# Patient Record
Sex: MALE | Race: WHITE | NOT HISPANIC OR LATINO | Employment: FULL TIME | URBAN - METROPOLITAN AREA
[De-identification: names, ages, dates, MRNs, and addresses within clinical notes are randomized per-mention and may not be internally consistent; named-entity substitution may affect disease eponyms.]

---

## 2019-07-10 ENCOUNTER — SURGERY - HEALTHEAST (OUTPATIENT)
Dept: CARDIOLOGY | Facility: CLINIC | Age: 49
End: 2019-07-10

## 2019-07-11 ASSESSMENT — MIFFLIN-ST. JEOR: SCORE: 1868.24

## 2019-07-12 ASSESSMENT — MIFFLIN-ST. JEOR: SCORE: 1849.64

## 2019-07-13 ASSESSMENT — MIFFLIN-ST. JEOR: SCORE: 1833.76

## 2019-07-17 ENCOUNTER — AMBULATORY - HEALTHEAST (OUTPATIENT)
Dept: CARDIAC REHAB | Facility: CLINIC | Age: 49
End: 2019-07-17

## 2019-07-17 DIAGNOSIS — I21.11 ST ELEVATION MYOCARDIAL INFARCTION INVOLVING RIGHT CORONARY ARTERY (H): ICD-10-CM

## 2019-07-17 DIAGNOSIS — Z95.5 STENTED CORONARY ARTERY: ICD-10-CM

## 2019-07-19 ENCOUNTER — AMBULATORY - HEALTHEAST (OUTPATIENT)
Dept: CARDIAC REHAB | Facility: CLINIC | Age: 49
End: 2019-07-19

## 2019-07-19 DIAGNOSIS — I21.11 ST ELEVATION MYOCARDIAL INFARCTION INVOLVING RIGHT CORONARY ARTERY (H): ICD-10-CM

## 2019-07-19 DIAGNOSIS — Z95.5 STENTED CORONARY ARTERY: ICD-10-CM

## 2019-07-22 ENCOUNTER — AMBULATORY - HEALTHEAST (OUTPATIENT)
Dept: CARDIAC REHAB | Facility: CLINIC | Age: 49
End: 2019-07-22

## 2019-07-22 DIAGNOSIS — I21.11 ST ELEVATION MYOCARDIAL INFARCTION INVOLVING RIGHT CORONARY ARTERY (H): ICD-10-CM

## 2019-07-22 DIAGNOSIS — Z95.5 STENTED CORONARY ARTERY: ICD-10-CM

## 2019-07-24 ENCOUNTER — AMBULATORY - HEALTHEAST (OUTPATIENT)
Dept: CARDIAC REHAB | Facility: CLINIC | Age: 49
End: 2019-07-24

## 2019-07-24 DIAGNOSIS — I21.11 ST ELEVATION MYOCARDIAL INFARCTION INVOLVING RIGHT CORONARY ARTERY (H): ICD-10-CM

## 2019-07-24 DIAGNOSIS — Z95.5 STENTED CORONARY ARTERY: ICD-10-CM

## 2019-07-26 ENCOUNTER — AMBULATORY - HEALTHEAST (OUTPATIENT)
Dept: CARDIAC REHAB | Facility: CLINIC | Age: 49
End: 2019-07-26

## 2019-07-26 DIAGNOSIS — I21.11 ST ELEVATION MYOCARDIAL INFARCTION INVOLVING RIGHT CORONARY ARTERY (H): ICD-10-CM

## 2019-07-26 DIAGNOSIS — Z95.5 STENTED CORONARY ARTERY: ICD-10-CM

## 2019-07-29 ENCOUNTER — AMBULATORY - HEALTHEAST (OUTPATIENT)
Dept: CARDIAC REHAB | Facility: CLINIC | Age: 49
End: 2019-07-29

## 2019-07-29 DIAGNOSIS — I21.11 ST ELEVATION MYOCARDIAL INFARCTION INVOLVING RIGHT CORONARY ARTERY (H): ICD-10-CM

## 2019-07-29 DIAGNOSIS — Z95.5 STENTED CORONARY ARTERY: ICD-10-CM

## 2019-08-02 ENCOUNTER — AMBULATORY - HEALTHEAST (OUTPATIENT)
Dept: CARDIAC REHAB | Facility: CLINIC | Age: 49
End: 2019-08-02

## 2019-08-02 DIAGNOSIS — Z95.5 STENTED CORONARY ARTERY: ICD-10-CM

## 2019-08-02 DIAGNOSIS — I21.11 ST ELEVATION MYOCARDIAL INFARCTION INVOLVING RIGHT CORONARY ARTERY (H): ICD-10-CM

## 2019-08-05 ENCOUNTER — OFFICE VISIT - HEALTHEAST (OUTPATIENT)
Dept: CARDIOLOGY | Facility: CLINIC | Age: 49
End: 2019-08-05

## 2019-08-05 DIAGNOSIS — E78.2 MIXED HYPERLIPIDEMIA: ICD-10-CM

## 2019-08-05 DIAGNOSIS — R73.03 PREDIABETES: ICD-10-CM

## 2019-08-05 DIAGNOSIS — I95.9 HYPOTENSION, UNSPECIFIED HYPOTENSION TYPE: ICD-10-CM

## 2019-08-05 DIAGNOSIS — I21.11 ST ELEVATION MYOCARDIAL INFARCTION INVOLVING RIGHT CORONARY ARTERY (H): ICD-10-CM

## 2019-08-05 DIAGNOSIS — R06.09 EXERTIONAL DYSPNEA: ICD-10-CM

## 2019-08-05 DIAGNOSIS — I25.118 CORONARY ARTERY DISEASE INVOLVING NATIVE CORONARY ARTERY OF NATIVE HEART WITH OTHER FORM OF ANGINA PECTORIS (H): ICD-10-CM

## 2019-08-05 DIAGNOSIS — I10 ESSENTIAL HYPERTENSION: ICD-10-CM

## 2019-08-05 DIAGNOSIS — I21.21 ACUTE ST ELEVATION MYOCARDIAL INFARCTION (STEMI) DUE TO OCCLUSION OF CIRCUMFLEX CORONARY ARTERY (H): ICD-10-CM

## 2019-08-05 DIAGNOSIS — I25.5 ISCHEMIC CARDIOMYOPATHY: ICD-10-CM

## 2019-08-05 DIAGNOSIS — R61 DIAPHORESIS: ICD-10-CM

## 2019-08-05 ASSESSMENT — MIFFLIN-ST. JEOR: SCORE: 1841.02

## 2019-08-07 ENCOUNTER — AMBULATORY - HEALTHEAST (OUTPATIENT)
Dept: CARDIAC REHAB | Facility: CLINIC | Age: 49
End: 2019-08-07

## 2019-08-07 DIAGNOSIS — I21.11 ST ELEVATION MYOCARDIAL INFARCTION INVOLVING RIGHT CORONARY ARTERY (H): ICD-10-CM

## 2019-08-07 DIAGNOSIS — Z95.5 STENTED CORONARY ARTERY: ICD-10-CM

## 2019-08-09 ENCOUNTER — AMBULATORY - HEALTHEAST (OUTPATIENT)
Dept: CARDIAC REHAB | Facility: CLINIC | Age: 49
End: 2019-08-09

## 2019-08-09 DIAGNOSIS — Z95.5 STENTED CORONARY ARTERY: ICD-10-CM

## 2019-08-09 DIAGNOSIS — I21.11 ST ELEVATION MYOCARDIAL INFARCTION INVOLVING RIGHT CORONARY ARTERY (H): ICD-10-CM

## 2019-08-12 ENCOUNTER — AMBULATORY - HEALTHEAST (OUTPATIENT)
Dept: CARDIAC REHAB | Facility: CLINIC | Age: 49
End: 2019-08-12

## 2019-08-12 DIAGNOSIS — Z95.5 STENTED CORONARY ARTERY: ICD-10-CM

## 2019-08-12 DIAGNOSIS — I21.11 ST ELEVATION MYOCARDIAL INFARCTION INVOLVING RIGHT CORONARY ARTERY (H): ICD-10-CM

## 2019-08-14 ENCOUNTER — AMBULATORY - HEALTHEAST (OUTPATIENT)
Dept: CARDIAC REHAB | Facility: CLINIC | Age: 49
End: 2019-08-14

## 2019-08-14 DIAGNOSIS — Z95.5 STENTED CORONARY ARTERY: ICD-10-CM

## 2019-08-14 DIAGNOSIS — I21.11 ST ELEVATION MYOCARDIAL INFARCTION INVOLVING RIGHT CORONARY ARTERY (H): ICD-10-CM

## 2019-08-16 ENCOUNTER — AMBULATORY - HEALTHEAST (OUTPATIENT)
Dept: CARDIAC REHAB | Facility: CLINIC | Age: 49
End: 2019-08-16

## 2019-08-16 DIAGNOSIS — Z95.5 STENTED CORONARY ARTERY: ICD-10-CM

## 2019-08-16 DIAGNOSIS — I21.11 ST ELEVATION MYOCARDIAL INFARCTION INVOLVING RIGHT CORONARY ARTERY (H): ICD-10-CM

## 2019-08-19 ENCOUNTER — COMMUNICATION - HEALTHEAST (OUTPATIENT)
Dept: CARDIOLOGY | Facility: CLINIC | Age: 49
End: 2019-08-19

## 2019-08-19 ENCOUNTER — AMBULATORY - HEALTHEAST (OUTPATIENT)
Dept: CARDIAC REHAB | Facility: CLINIC | Age: 49
End: 2019-08-19

## 2019-08-19 DIAGNOSIS — I21.21 ACUTE ST ELEVATION MYOCARDIAL INFARCTION (STEMI) DUE TO OCCLUSION OF CIRCUMFLEX CORONARY ARTERY (H): ICD-10-CM

## 2019-08-19 DIAGNOSIS — Z95.5 STENTED CORONARY ARTERY: ICD-10-CM

## 2019-08-19 DIAGNOSIS — I21.11 ST ELEVATION MYOCARDIAL INFARCTION INVOLVING RIGHT CORONARY ARTERY (H): ICD-10-CM

## 2019-08-21 ENCOUNTER — AMBULATORY - HEALTHEAST (OUTPATIENT)
Dept: CARDIAC REHAB | Facility: CLINIC | Age: 49
End: 2019-08-21

## 2019-08-21 DIAGNOSIS — I21.11 ST ELEVATION MYOCARDIAL INFARCTION INVOLVING RIGHT CORONARY ARTERY (H): ICD-10-CM

## 2019-08-21 DIAGNOSIS — Z95.5 STENTED CORONARY ARTERY: ICD-10-CM

## 2019-08-23 ENCOUNTER — AMBULATORY - HEALTHEAST (OUTPATIENT)
Dept: CARDIAC REHAB | Facility: CLINIC | Age: 49
End: 2019-08-23

## 2019-08-23 DIAGNOSIS — Z95.5 STENTED CORONARY ARTERY: ICD-10-CM

## 2019-08-23 DIAGNOSIS — I21.11 ST ELEVATION MYOCARDIAL INFARCTION INVOLVING RIGHT CORONARY ARTERY (H): ICD-10-CM

## 2019-08-26 ENCOUNTER — AMBULATORY - HEALTHEAST (OUTPATIENT)
Dept: CARDIAC REHAB | Facility: CLINIC | Age: 49
End: 2019-08-26

## 2019-08-26 DIAGNOSIS — I21.11 ST ELEVATION MYOCARDIAL INFARCTION INVOLVING RIGHT CORONARY ARTERY (H): ICD-10-CM

## 2019-08-28 ENCOUNTER — AMBULATORY - HEALTHEAST (OUTPATIENT)
Dept: CARDIAC REHAB | Facility: CLINIC | Age: 49
End: 2019-08-28

## 2019-08-28 DIAGNOSIS — I21.11 ST ELEVATION MYOCARDIAL INFARCTION INVOLVING RIGHT CORONARY ARTERY (H): ICD-10-CM

## 2019-08-28 DIAGNOSIS — Z95.5 STENTED CORONARY ARTERY: ICD-10-CM

## 2019-08-30 ENCOUNTER — AMBULATORY - HEALTHEAST (OUTPATIENT)
Dept: CARDIAC REHAB | Facility: CLINIC | Age: 49
End: 2019-08-30

## 2019-08-30 DIAGNOSIS — Z95.5 STENTED CORONARY ARTERY: ICD-10-CM

## 2019-08-30 DIAGNOSIS — I21.11 ST ELEVATION MYOCARDIAL INFARCTION INVOLVING RIGHT CORONARY ARTERY (H): ICD-10-CM

## 2019-09-04 ENCOUNTER — AMBULATORY - HEALTHEAST (OUTPATIENT)
Dept: CARDIAC REHAB | Facility: CLINIC | Age: 49
End: 2019-09-04

## 2019-09-04 DIAGNOSIS — I21.11 ST ELEVATION MYOCARDIAL INFARCTION INVOLVING RIGHT CORONARY ARTERY (H): ICD-10-CM

## 2019-09-06 ENCOUNTER — AMBULATORY - HEALTHEAST (OUTPATIENT)
Dept: CARDIAC REHAB | Facility: CLINIC | Age: 49
End: 2019-09-06

## 2019-09-06 DIAGNOSIS — I21.11 ST ELEVATION MYOCARDIAL INFARCTION INVOLVING RIGHT CORONARY ARTERY (H): ICD-10-CM

## 2019-09-09 ENCOUNTER — AMBULATORY - HEALTHEAST (OUTPATIENT)
Dept: CARDIAC REHAB | Facility: CLINIC | Age: 49
End: 2019-09-09

## 2019-09-09 DIAGNOSIS — Z95.5 STENTED CORONARY ARTERY: ICD-10-CM

## 2019-09-09 DIAGNOSIS — I21.11 ST ELEVATION MYOCARDIAL INFARCTION INVOLVING RIGHT CORONARY ARTERY (H): ICD-10-CM

## 2019-09-13 ENCOUNTER — AMBULATORY - HEALTHEAST (OUTPATIENT)
Dept: CARDIAC REHAB | Facility: CLINIC | Age: 49
End: 2019-09-13

## 2019-09-13 DIAGNOSIS — Z95.5 STENTED CORONARY ARTERY: ICD-10-CM

## 2019-09-13 DIAGNOSIS — I21.11 ST ELEVATION MYOCARDIAL INFARCTION INVOLVING RIGHT CORONARY ARTERY (H): ICD-10-CM

## 2019-09-16 ENCOUNTER — AMBULATORY - HEALTHEAST (OUTPATIENT)
Dept: CARDIAC REHAB | Facility: CLINIC | Age: 49
End: 2019-09-16

## 2019-09-16 DIAGNOSIS — Z95.5 STENTED CORONARY ARTERY: ICD-10-CM

## 2019-09-16 DIAGNOSIS — I21.11 ST ELEVATION MYOCARDIAL INFARCTION INVOLVING RIGHT CORONARY ARTERY (H): ICD-10-CM

## 2019-09-20 ENCOUNTER — OFFICE VISIT - HEALTHEAST (OUTPATIENT)
Dept: CARDIOLOGY | Facility: CLINIC | Age: 49
End: 2019-09-20

## 2019-09-20 DIAGNOSIS — I25.10 CORONARY ARTERY DISEASE INVOLVING NATIVE CORONARY ARTERY OF NATIVE HEART WITHOUT ANGINA PECTORIS: ICD-10-CM

## 2019-09-20 DIAGNOSIS — E78.2 MIXED HYPERLIPIDEMIA: ICD-10-CM

## 2019-09-20 DIAGNOSIS — I47.29 NSVT (NONSUSTAINED VENTRICULAR TACHYCARDIA) (H): ICD-10-CM

## 2019-09-20 DIAGNOSIS — I21.11 ST ELEVATION MYOCARDIAL INFARCTION INVOLVING RIGHT CORONARY ARTERY (H): ICD-10-CM

## 2019-09-20 ASSESSMENT — MIFFLIN-ST. JEOR: SCORE: 1888.19

## 2019-09-23 ENCOUNTER — AMBULATORY - HEALTHEAST (OUTPATIENT)
Dept: CARDIAC REHAB | Facility: CLINIC | Age: 49
End: 2019-09-23

## 2019-09-23 DIAGNOSIS — I21.11 ST ELEVATION MYOCARDIAL INFARCTION INVOLVING RIGHT CORONARY ARTERY (H): ICD-10-CM

## 2019-09-27 ENCOUNTER — AMBULATORY - HEALTHEAST (OUTPATIENT)
Dept: CARDIAC REHAB | Facility: CLINIC | Age: 49
End: 2019-09-27

## 2019-09-27 DIAGNOSIS — I21.11 ST ELEVATION MYOCARDIAL INFARCTION INVOLVING RIGHT CORONARY ARTERY (H): ICD-10-CM

## 2019-09-27 DIAGNOSIS — Z95.5 STENTED CORONARY ARTERY: ICD-10-CM

## 2019-09-30 ENCOUNTER — AMBULATORY - HEALTHEAST (OUTPATIENT)
Dept: CARDIAC REHAB | Facility: CLINIC | Age: 49
End: 2019-09-30

## 2019-09-30 DIAGNOSIS — I21.11 ST ELEVATION MYOCARDIAL INFARCTION INVOLVING RIGHT CORONARY ARTERY (H): ICD-10-CM

## 2019-09-30 DIAGNOSIS — Z95.5 STENTED CORONARY ARTERY: ICD-10-CM

## 2019-10-04 ENCOUNTER — AMBULATORY - HEALTHEAST (OUTPATIENT)
Dept: CARDIAC REHAB | Facility: CLINIC | Age: 49
End: 2019-10-04

## 2019-10-04 DIAGNOSIS — I21.11 ST ELEVATION MYOCARDIAL INFARCTION INVOLVING RIGHT CORONARY ARTERY (H): ICD-10-CM

## 2019-10-04 DIAGNOSIS — Z95.5 STENTED CORONARY ARTERY: ICD-10-CM

## 2019-10-07 ENCOUNTER — AMBULATORY - HEALTHEAST (OUTPATIENT)
Dept: CARDIAC REHAB | Facility: CLINIC | Age: 49
End: 2019-10-07

## 2019-10-07 DIAGNOSIS — Z95.5 STENTED CORONARY ARTERY: ICD-10-CM

## 2019-10-07 DIAGNOSIS — I21.11 ST ELEVATION MYOCARDIAL INFARCTION INVOLVING RIGHT CORONARY ARTERY (H): ICD-10-CM

## 2019-10-11 ENCOUNTER — AMBULATORY - HEALTHEAST (OUTPATIENT)
Dept: CARDIAC REHAB | Facility: CLINIC | Age: 49
End: 2019-10-11

## 2019-10-11 DIAGNOSIS — I21.11 ST ELEVATION MYOCARDIAL INFARCTION INVOLVING RIGHT CORONARY ARTERY (H): ICD-10-CM

## 2019-10-14 ENCOUNTER — AMBULATORY - HEALTHEAST (OUTPATIENT)
Dept: CARDIAC REHAB | Facility: CLINIC | Age: 49
End: 2019-10-14

## 2019-10-14 DIAGNOSIS — I21.11 ST ELEVATION MYOCARDIAL INFARCTION INVOLVING RIGHT CORONARY ARTERY (H): ICD-10-CM

## 2019-10-14 DIAGNOSIS — Z95.5 STENTED CORONARY ARTERY: ICD-10-CM

## 2019-10-18 ENCOUNTER — AMBULATORY - HEALTHEAST (OUTPATIENT)
Dept: CARDIAC REHAB | Facility: CLINIC | Age: 49
End: 2019-10-18

## 2019-10-18 ENCOUNTER — AMBULATORY - HEALTHEAST (OUTPATIENT)
Dept: LAB | Facility: CLINIC | Age: 49
End: 2019-10-18

## 2019-10-18 ENCOUNTER — COMMUNICATION - HEALTHEAST (OUTPATIENT)
Dept: CARDIOLOGY | Facility: CLINIC | Age: 49
End: 2019-10-18

## 2019-10-18 DIAGNOSIS — I25.10 CORONARY ARTERY DISEASE INVOLVING NATIVE CORONARY ARTERY OF NATIVE HEART WITHOUT ANGINA PECTORIS: ICD-10-CM

## 2019-10-18 DIAGNOSIS — I21.11 ST ELEVATION MYOCARDIAL INFARCTION INVOLVING RIGHT CORONARY ARTERY (H): ICD-10-CM

## 2019-10-18 DIAGNOSIS — E78.2 MIXED HYPERLIPIDEMIA: ICD-10-CM

## 2019-10-18 DIAGNOSIS — Z95.5 STENTED CORONARY ARTERY: ICD-10-CM

## 2019-10-18 LAB
CHOLEST SERPL-MCNC: 127 MG/DL
FASTING STATUS PATIENT QL REPORTED: YES
HDLC SERPL-MCNC: 38 MG/DL
LDLC SERPL CALC-MCNC: 65 MG/DL
TRIGL SERPL-MCNC: 120 MG/DL

## 2019-10-21 ENCOUNTER — AMBULATORY - HEALTHEAST (OUTPATIENT)
Dept: CARDIAC REHAB | Facility: CLINIC | Age: 49
End: 2019-10-21

## 2019-10-21 DIAGNOSIS — I21.11 ST ELEVATION MYOCARDIAL INFARCTION INVOLVING RIGHT CORONARY ARTERY (H): ICD-10-CM

## 2019-10-21 DIAGNOSIS — Z95.5 STENTED CORONARY ARTERY: ICD-10-CM

## 2019-10-28 ENCOUNTER — AMBULATORY - HEALTHEAST (OUTPATIENT)
Dept: CARDIAC REHAB | Facility: CLINIC | Age: 49
End: 2019-10-28

## 2019-10-28 DIAGNOSIS — I21.11 ST ELEVATION MYOCARDIAL INFARCTION INVOLVING RIGHT CORONARY ARTERY (H): ICD-10-CM

## 2019-10-28 DIAGNOSIS — Z95.5 STENTED CORONARY ARTERY: ICD-10-CM

## 2019-11-01 ENCOUNTER — AMBULATORY - HEALTHEAST (OUTPATIENT)
Dept: CARDIAC REHAB | Facility: CLINIC | Age: 49
End: 2019-11-01

## 2019-11-01 DIAGNOSIS — I21.11 ST ELEVATION MYOCARDIAL INFARCTION INVOLVING RIGHT CORONARY ARTERY (H): ICD-10-CM

## 2019-11-01 DIAGNOSIS — Z95.5 STENTED CORONARY ARTERY: ICD-10-CM

## 2020-07-06 ENCOUNTER — COMMUNICATION - HEALTHEAST (OUTPATIENT)
Dept: CARDIOLOGY | Facility: CLINIC | Age: 50
End: 2020-07-06

## 2020-07-07 ENCOUNTER — OFFICE VISIT - HEALTHEAST (OUTPATIENT)
Dept: CARDIOLOGY | Facility: CLINIC | Age: 50
End: 2020-07-07

## 2020-07-07 DIAGNOSIS — I25.10 CORONARY ARTERY DISEASE INVOLVING NATIVE CORONARY ARTERY OF NATIVE HEART WITHOUT ANGINA PECTORIS: ICD-10-CM

## 2020-07-07 DIAGNOSIS — I10 ESSENTIAL HYPERTENSION: ICD-10-CM

## 2020-07-07 DIAGNOSIS — I49.3 PVC'S (PREMATURE VENTRICULAR CONTRACTIONS): ICD-10-CM

## 2020-07-07 DIAGNOSIS — I21.11 ST ELEVATION MYOCARDIAL INFARCTION INVOLVING RIGHT CORONARY ARTERY (H): ICD-10-CM

## 2020-07-07 DIAGNOSIS — E78.2 MIXED HYPERLIPIDEMIA: ICD-10-CM

## 2020-07-07 RX ORDER — METOPROLOL SUCCINATE 25 MG/1
25 TABLET, EXTENDED RELEASE ORAL DAILY
Qty: 90 TABLET | Refills: 3 | Status: SHIPPED | OUTPATIENT
Start: 2020-07-07 | End: 2021-07-09

## 2020-07-07 RX ORDER — ATORVASTATIN CALCIUM 80 MG/1
80 TABLET, FILM COATED ORAL AT BEDTIME
Qty: 90 TABLET | Refills: 3 | Status: SHIPPED | OUTPATIENT
Start: 2020-07-07 | End: 2021-07-09

## 2020-07-07 ASSESSMENT — MIFFLIN-ST. JEOR: SCORE: 1939.68

## 2020-08-14 ENCOUNTER — OFFICE VISIT - HEALTHEAST (OUTPATIENT)
Dept: INTERNAL MEDICINE | Facility: CLINIC | Age: 50
End: 2020-08-14

## 2020-08-14 DIAGNOSIS — Z13.1 SCREENING FOR DIABETES MELLITUS: ICD-10-CM

## 2020-08-14 DIAGNOSIS — Z00.00 ROUTINE GENERAL MEDICAL EXAMINATION AT A HEALTH CARE FACILITY: ICD-10-CM

## 2020-08-14 DIAGNOSIS — E78.2 MIXED HYPERLIPIDEMIA: ICD-10-CM

## 2020-08-14 DIAGNOSIS — Z12.11 SCREEN FOR COLON CANCER: ICD-10-CM

## 2020-08-14 DIAGNOSIS — Z23 IMMUNIZATION DUE: ICD-10-CM

## 2020-08-14 DIAGNOSIS — Z12.11 ENCOUNTER FOR SCREENING COLONOSCOPY: ICD-10-CM

## 2020-08-14 DIAGNOSIS — I10 ESSENTIAL HYPERTENSION WITH GOAL BLOOD PRESSURE LESS THAN 140/90: ICD-10-CM

## 2020-08-14 DIAGNOSIS — I25.5 ISCHEMIC CARDIOMYOPATHY: ICD-10-CM

## 2020-08-14 DIAGNOSIS — I25.118 CORONARY ARTERY DISEASE INVOLVING NATIVE CORONARY ARTERY OF NATIVE HEART WITH OTHER FORM OF ANGINA PECTORIS (H): ICD-10-CM

## 2020-08-14 DIAGNOSIS — L30.1 DYSHIDROTIC ECZEMA: ICD-10-CM

## 2020-08-14 DIAGNOSIS — Z12.5 SCREENING FOR PROSTATE CANCER: ICD-10-CM

## 2020-08-14 LAB
ALBUMIN SERPL-MCNC: 4.1 G/DL (ref 3.5–5)
ALP SERPL-CCNC: 105 U/L (ref 45–120)
ALT SERPL W P-5'-P-CCNC: 48 U/L (ref 0–45)
ANION GAP SERPL CALCULATED.3IONS-SCNC: 9 MMOL/L (ref 5–18)
AST SERPL W P-5'-P-CCNC: 28 U/L (ref 0–40)
BILIRUB SERPL-MCNC: 0.5 MG/DL (ref 0–1)
BUN SERPL-MCNC: 10 MG/DL (ref 8–22)
CALCIUM SERPL-MCNC: 9.4 MG/DL (ref 8.5–10.5)
CHLORIDE BLD-SCNC: 105 MMOL/L (ref 98–107)
CHOLEST SERPL-MCNC: 136 MG/DL
CO2 SERPL-SCNC: 26 MMOL/L (ref 22–31)
CREAT SERPL-MCNC: 1 MG/DL (ref 0.7–1.3)
ERYTHROCYTE [DISTWIDTH] IN BLOOD BY AUTOMATED COUNT: 12.1 % (ref 11–14.5)
FASTING STATUS PATIENT QL REPORTED: YES
GFR SERPL CREATININE-BSD FRML MDRD: >60 ML/MIN/1.73M2
GLUCOSE BLD-MCNC: 104 MG/DL (ref 70–125)
HBA1C MFR BLD: 6 %
HCT VFR BLD AUTO: 43.3 % (ref 40–54)
HDLC SERPL-MCNC: 34 MG/DL
HGB BLD-MCNC: 14.6 G/DL (ref 14–18)
LDLC SERPL CALC-MCNC: 71 MG/DL
MCH RBC QN AUTO: 30.3 PG (ref 27–34)
MCHC RBC AUTO-ENTMCNC: 33.6 G/DL (ref 32–36)
MCV RBC AUTO: 90 FL (ref 80–100)
PLATELET # BLD AUTO: 257 THOU/UL (ref 140–440)
PMV BLD AUTO: 6.8 FL (ref 7–10)
POTASSIUM BLD-SCNC: 4.6 MMOL/L (ref 3.5–5)
PROT SERPL-MCNC: 7 G/DL (ref 6–8)
PSA SERPL-MCNC: 0.2 NG/ML (ref 0–3.5)
RBC # BLD AUTO: 4.8 MILL/UL (ref 4.4–6.2)
SODIUM SERPL-SCNC: 140 MMOL/L (ref 136–145)
TRIGL SERPL-MCNC: 153 MG/DL
WBC: 7.8 THOU/UL (ref 4–11)

## 2020-08-14 RX ORDER — CLOBETASOL PROPIONATE 0.5 MG/G
OINTMENT TOPICAL 2 TIMES DAILY
Qty: 60 G | Refills: 1 | Status: SHIPPED | OUTPATIENT
Start: 2020-08-14 | End: 2023-02-06

## 2020-08-14 ASSESSMENT — MIFFLIN-ST. JEOR: SCORE: 1939.68

## 2020-11-02 ENCOUNTER — AMBULATORY - HEALTHEAST (OUTPATIENT)
Dept: NURSING | Facility: CLINIC | Age: 50
End: 2020-11-02

## 2020-11-02 DIAGNOSIS — Z23 IMMUNIZATION DUE: ICD-10-CM

## 2021-04-05 ENCOUNTER — AMBULATORY - HEALTHEAST (OUTPATIENT)
Dept: NURSING | Facility: CLINIC | Age: 51
End: 2021-04-05

## 2021-04-26 ENCOUNTER — AMBULATORY - HEALTHEAST (OUTPATIENT)
Dept: NURSING | Facility: CLINIC | Age: 51
End: 2021-04-26

## 2021-05-30 NOTE — PROGRESS NOTES
ITP ASSESSMENT   Assessment Day: Initial    Session Number: 1/2    Diagnosis: Stent    Risk Stratification: High    Referring Provider: Itz Alicea MD   ITPs sent to Dr. Hoover  EXERCISE  Exercise Assessment: Initial       6 Minute Walk Test   Pre   Pre Exercise HR: 77                    Pre Exercise BP: 110/68      Peak  Peak HR: 90                   Peak BP: 128/64    Peak feet: 1300    Peak O2 SAT: 99    Peak RPE: 11    Peak MPH: 2.46      Symptoms:  Peak Symptoms: Pt denies cv s/s      5 mins. Post  5 Min Post HR: 79    5 Min Post BP: 116/66                           Exercise Plan  Goals Next 30 days  ADL'S: Resume carrying <25 lbs     Leisure: Exercising 2-3 x/week for 15-20 minutes outside of cardiac rehab    Work: Pt is independent in all work duties.    Education Goals: All goals in this section met    Education Goals Met: Patient can state cardiac s/s and appropriate emergency response.;Has system for taking medication.;Medication review.      Exercise Prescription  Exercise Mode: Treadmill;Bike;Nustep;Arm Erg.;Stairs;Hallway Walking    Frequency: 2-3x/week    Duration: 30-45 minutes    Intensity / THR: 20-30 beats above resting heart rate    RPE 11-14  Progression / Met level: 3-4 METs     Resistive Training?: No      Current Exercise (mins/week): 0      Interventions  Home Exercise:  Mode: walking    Frequency: 2-3x/week    Duration: 15-20 minutes      Education Material : Provide written material;Offer educational classes;Individual education and counseling      Education Completed  Exercise Education Completed: Signs and Symptoms;Medication review;RPE;Emergency Plan;Home Exercise;Warm up/cool down;FITT Principles;BP/HR Reponse to exercise;Stretching;Strength training;Cardiac Anatomy;Benefits of Exercise;End point of exercise              Exercise Follow-up/Discharge  Follow up/Discharge: Encourage pt to exercise outside of cardiac rehab.    NUTRITION  Nutrition Assessment:  "Initial      Nutrition Risk Factors:  Nutrition Risk Factors: Overweight      Nutrition Plan  Interventions  Diet Consult: NA    Other Nutrition Intervention: Therapist/Pt Discussion;Provide with Written Material    Education Completed  Nutrition Education Completed: Risk factor overview;Low Saturated fat diet;Low sodium diet      Goals  Nutrition Goals (Next 30 days): Patient can identify their risk factors for CAD;Patient will follow a low sodium diet;Patient able to demonstrate carbohydrate counting;Provide Rate your Plate Survey;Review Dietitian schedule;Patient will follow a low saturated fat diet;Patient knows appropriate portion size;Patient will lose weight;Improve Rate Your Plate Survey Score      Goals Met    Height, Weight, and  BMI  Weight: 219 lb (99.3 kg)  Height: 5' 10\" (1.778 m)  BMI: 31.42      Nutrition Follow-up  Follow-up/Discharge: Pt is encouraged to follow a low sodium, low fat diet.          Other Risk Factors  Other Risk Factor Assessment: Initial      HTN Risk Factor: NA      Pre Exercise BP: 110/68  Post Exercise BP: 116/66      Hypertension Plan  Goals    Goals Met    HTN Interventions    HTN Education Completed    Tobacco Risk Factor: Tobacco      Current Use:: Quit 7/10/19      Tobacco Plan  Tobacco Goals  Tobacco Goals: Patient remain tobacco free      Goals Met  Tobacco Goals Met: Patient remain tobacco free      Tobacco Interventions  Tobacco Interventions: Therapist/patient discussion;Provide written material      Tobacco Education Completed  Tobacco Education Completed: Health benefits of tobacco cessation;Risk factor overview      Risk Factor Follow-up   Follow-up/Discharge: Pt has recently quit smoking. Pt has not had an issue with staying tobacco free as of this time.      PSYCHOSOCIAL  Psychosocial Assessment: Initial       [facility] Q of L Summary Score: 18      PHQ-9 Total Score: 2      Psychosocial Risk Factor: NA      Psychosocial Plan  Interventions  If PHQ-9 is >9, " send letter to MD  Interventions: Provide written material       Education Completed  Education Completed: Relaxation/Coping Techniques      Goals  Goals (Next 30 days): Patient demonstrates understanding of stress, no goals identified for the next 30 days      Goals Met  Goals Met: Identified Support system;Identify stressors;Practicing stress management skills      Psychosocial Follow-up  Follow-up/Discharge: Pt denies stress. Reports he has a stress free job and home life.              Patient involved in Goal setting?: Yes      Signature: _____________________________________________________________    Date: __________________    Time: __________________

## 2021-05-30 NOTE — PROGRESS NOTES
Cardiac Rehab  Phase II Assessment    Assessment Date: 7/17/19    Diagnosis: STEMI, Stent  Date of Onset: 7/10/19  ICD/Pacemaker: No Parameters: NA  Post-op Complications: None  ECG History: SR EF%:65  Past Medical History: None    Physical Assessment  Precautions/ Physical Limitations: None  Oxygen: No O2 Sats: 99 Lung Sounds: Clear Edema: 1  Incisions: Minimal bruising  Sleeping Pattern: good   Appetite: good     Pain  Location: NA  Characteristics:NA  Intensity: (0-10 scale) 0  Current Pain Management: NA  Intervention: NA  Response: NA    Psychosocial/ Emotional Health  1. In the past 12 months, have you been in a relationship where you have been abused physically, emotionally, sexually or financially? No  notified: NA  2. Who do you turn to for emotional support?: Family and friends  3. Do you have cultural or spiritual needs? No  4. Have there been any major life changes in the past 12 months? No    Referral Information  Primary Physician: Provider, No Primary Care  Cardiologist: Itz Alicea  Surgeon: Itz Alicea    Home exercise/Equipment: none    Patient's long-term goal(s): Lose weight, Resume all activities    1. Living Accommodations: Home Steps: Yes      Support people at home: No   2. Marital Status: single  3. Family is able to assist with cares      Jehovah's witness/Community involvement: None  4. Recreation/Hobbies: Golfing, Repairing and rebuilding golf carts

## 2021-05-30 NOTE — PROGRESS NOTES
Summary of Event:  Internal 9 called because pt became lightheaded, pale and diaphoretic and had a near syncopal/syncopal a few seconds and did not respond and head bobbed forward when tapping shoulder pt responded. When BP able to get was 62/40 O2 sats 98% with Bigeminy-pt remained in Bigeminy when being escorted to ER.  Per Dr. Silverio recommendations-pt sent and willing went to ER via cart     Information called/faxed to MD/NP:via Epic note  Clinician Name:Rina Beaver    Dispensation of Patient:  Sent to Emergency Room      Parameter On Arrival With Event At Departure   Time 0800 [id number]   Heart Rate 79 40-60 40-60   Rhythm SR with PVC's SB/Bigeminy SB/Bigeminy   Blood Pressure 108/62 62/40 78/58   Oxygen Sats. N/A 98 98   Blood Sugar mg/dl - - -   Other:        Follow-up of Outcome:  Review pt records and recommendations before returning to CR.

## 2021-05-31 NOTE — PROGRESS NOTES
ITP ASSESSMENT   Assessment Day: 30 Day    Session Number: 13  Precautions: standard cardiac    Diagnosis: Stent    Risk Stratification: High    Referring Provider: Itz Alicea MD   ITP sent to Dr. Hoover  EXERCISE  Exercise Assessment: Reassessment       6 Minute Walk Test   Pre   Pre Exercise HR: 77                    Pre Exercise BP: 110/68      Peak  Peak HR: 90                   Peak BP: 128/64    Peak feet: 1300    Peak O2 SAT: 99    Peak RPE: 11    Peak MPH: 2.46      Symptoms:  Peak Symptoms: Pt denies cv s/s      5 mins. Post  5 Min Post HR: 79    5 Min Post BP: 116/66                           Exercise Plan  Goals Next 30 days  ADL'S: Resume mowing the lawn for 10 min. at a time without symptoms    Leisure: Exercising 2-3 x/week for 15-20 minutes outside of cardiac rehab    Work: Resume [employer] 1 game without symptoms      Education Goals: All goals in this section met    Education Goals Met: Patient can state cardiac s/s and appropriate emergency response.;Has system for taking medication.;Medication review.                          Goals Met  Initial ADL's goals met: Resumed carrying <25 lbs     Initial Leisure goals met: Goal not met: Exercising 2-3 x/week for 15-20 minutes outside of cardiac rehab    Intial Work goals met: Pt is independent in all work duties.    Initial Progression: Patient had symtpoms of low blod pressure and symptomatic with freq. PVCs, now has had some medication changes and is feeling better and wanting to continue to work on increasing intensities in cardiac rehab.  Patient is currently doing 2.8 METs on nustep,  3.3 METs on treadmill and 4.5 METs on stairs.      Exercise Prescription  Exercise Mode: Treadmill;Bike;Nustep;Arm Erg.;Stairs;Hallway Walking    Frequency: 2-3x/week    Duration: 30-45 min.    Intensity / THR: 20-30 beats above resting heart rate    RPE 11-14  Progression / Met level: 4.5-5    Resistive Training?: No      Current Exercise (mins/week):  "115      Interventions  Home Exercise:  Mode: walking    Frequency: 2-3x/week    Duration: 15-20 min.      Education Material : Provide written material;Offer educational classes;Individual education and counseling    Education Completed  Exercise Education Completed: Signs and Symptoms;Medication review;RPE;Emergency Plan;Home Exercise;Warm up/cool down;FITT Principles;BP/HR Reponse to exercise;Stretching;Strength training;Cardiac Anatomy;Benefits of Exercise;End point of exercise            Exercise Follow-up/Discharge  Follow up/Discharge: Encourage pt to exercise outside of cardiac rehab.   Patient plans to walk for home exercise and is thinking about purchasing a home treadmill.   NUTRITION  Nutrition Assessment: Reassessment      Nutrition Risk Factors:  Nutrition Risk Factors: Overweight      Nutrition Plan  Interventions  Diet Consult: Completed    Other Nutrition Intervention: Therapist/Pt Discussion    Initial Rate Your Plate Score: 47    Education Completed  Nutrition Education Completed: Risk factor overview;Low Saturated fat diet;Low sodium diet      Goals  Nutrition Goals (Next 30 days): Patient will follow a low sodium diet;Patient will follow a low saturated fat diet;Patient will lose weight      Goals Met  Nutrition Goals Met: Provided Rate your Plate Survey;Reviewed Dietitian schedule;Patient can identify their risk factors for CAD      Height, Weight, and  BMI  Weight: 219 lb 8 oz (99.6 kg)  Height: 5' 10\" (1.778 m)  BMI: 31.5      Nutrition Follow-up  Follow-up/Discharge: Patient states that he is grilling most of his meats, utilizing chicken and pork.  Patient states that he is following a low sodium diet and is not eating out much.         Other Risk Factors  Other Risk Factor Assessment: Reassessment      HTN Risk Factor: NA      Pre Exercise BP: 92/60  Post Exercise BP: 100/64      Hypertension Plan  Goals  No data recorded    Goals Met  No data recorded    HTN Interventions  No data " recorded    HTN Education Completed  No data recorded    Tobacco Risk Factor: Tobacco      Current Use:: Quit 7/10/19      Tobacco Plan  Tobacco Goals  Tobacco Goals: Patient remain tobacco free      Goals Met  Tobacco Goals Met: Patient remain tobacco free      Tobacco Interventions  Tobacco Interventions: Therapist/patient discussion;Provide written material      Tobacco Education Completed  Tobacco Education Completed: Health benefits of tobacco cessation;Risk factor overview      Risk Factor Follow-up   Follow-up/Discharge: Pt has recently quit smoking. Does report having some urges, to help with that he will take and eat a few skittles and then start doing something else and goes on with his day.   PSYCHOSOCIAL  Psychosocial Assessment: Reassessment       [facility] DEBBY of L Summary Score: 18      PHQ-9 Total Score: 2      Psychosocial Risk Factor: NA      Psychosocial Plan  Interventions  Interventions: Provide written material      Education Completed  Education Completed: Relaxation/Coping Techniques      Goals  Goals (Next 30 days): Patient demonstrates understanding of stress, no goals identified for the next 30 days      Goals Met  Goals Met: Identified Support system;Identify stressors;Practicing stress management skills      Psychosocial Follow-up  Follow-up/Discharge: Patient denies stress.  Patient does enjoy working out in his garage.           Patient involved in Goal setting?: Yes      Signature: _____________________________________________________________    Date: __________________    Time: __________________

## 2021-05-31 NOTE — PROGRESS NOTES
Jamie Charles has participated in 10 sessions of Phase II Cardiac Rehab.    Progress Report:   Cardiac Rehab Treatment Progress Report 8/7/2019 8/9/2019   Weight 217 lbs 3 oz 217 lbs 5 oz   Pre Exercise  HR 84 82   Pre Exercise /64 100/62   Pre Blood Sugar (mg/dl) - -   Treadmill Peak HR 97 96   Treadmill Peak Blood Pressure - 128/60   Heart Rate 82 80   Post Exercise BP 88/60 100/60   Post Blood Sugar (mg/dl) - -   ECG SR with rare PVC's SR with TWI   Total Exercise Minutes 15 50         Current Status:  Therapists Comments: Patient reports that he is continuing to feel better.    If Physician recommends change in treatment plan, please place orders.        __________________________________________________      _____________  Signature                                                                                                  Date

## 2021-06-01 NOTE — PROGRESS NOTES
ITP ASSESSMENT   Assessment Day: 60 Day    Session Number: 22  Precautions: standard cardiac    Diagnosis: Stent    Risk Stratification: High    Referring Provider: Itz Alicea MD   ITP sent to Dr. Hoover  EXERCISE  Exercise Assessment: Reassessment       6 Minute Walk Test   Pre   Pre Exercise HR: 77                    Pre Exercise BP: 110/68      Peak  Peak HR: 90                   Peak BP: 128/64    Peak feet: 1300    Peak O2 SAT: 99    Peak RPE: 11    Peak MPH: 2.46      Symptoms:  Peak Symptoms: Pt denies cv s/s      5 mins. Post  5 Min Post HR: 79    5 Min Post BP: 116/66                           Exercise Plan  Goals Next 30 days  ADL'S: Continue to do intervals in cardiac rehab 1-2x/week for 30+ min. to increase stamina    Leisure: Continue to utilize nustep with arms and arm erg 1-2x/week for 10+ min. in cardiac rehab to help with weight loss, goal to lose 5lbs in 30 days. current weight is 223.1lb    Work: Resume indoor painting wihtout symtpoms.      Education Goals: All goals in this section met    Education Goals Met: Patient can state cardiac s/s and appropriate emergency response.;Has system for taking medication.;Medication review.                          Goals Met  30 day ADL'S goals met: Resumed mowing the lawn for 10 min. at a time without symptoms    30 day Leisure goals met: Resumed exercising 2-3 x/week for 15-20 minutes outside of cardiac rehab    30 day Work goals met: Resumed bowling 1 game without symptoms    30 Day Progression: Patient has reached 4.1 MET level on nustep and 4.9 MET level on treadmill.      Initial ADL's goals met: Resumed carrying <25 lbs     Initial Leisure goals met: Goal not met: Exercising 2-3 x/week for 15-20 minutes outside of cardiac rehab    Intial Work goals met: Pt is independent in all work duties.    Initial Progression: Patient had symtpoms of low blod pressure and symptomatic with freq. PVCs, now has had some medication changes and is feeling  better and wanting to continue to work on increasing intensities in cardiac rehab.  Patient is currently doing 2.8 METs on nustep,  3.3 METs on treadmill and 4.5 METs on stairs.      Exercise Prescription  Exercise Mode: Treadmill;Bike;Nustep;Arm Erg.;Stairs;Hallway Walking    Frequency: 2-3x/week    Duration: 30-45 min.    Intensity / THR: 20-30 beats above resting heart rate    RPE 11-14  Progression / Met level: 5-6    Resistive Training?: No      Current Exercise (mins/week): 150      Interventions  Home Exercise:  Mode: walking, treadmill    Frequency: 2-3x/week    Duration: 30-60 min.      Education Material : Provide written material;Offer educational classes;Individual education and counseling      Education Completed  Exercise Education Completed: Signs and Symptoms;Medication review;RPE;Emergency Plan;Home Exercise;Warm up/cool down;FITT Principles;BP/HR Reponse to exercise;Stretching;Strength training;Cardiac Anatomy;Benefits of Exercise;End point of exercise              Exercise Follow-up/Discharge  Follow up/Discharge: Patient has been intervaling on treadmill in cardiac rehab and tolerating well, may try to interval on nustep in future.  Roberto is using treadmil for home exercise.   NUTRITION  Nutrition Assessment: Reassessment      Nutrition Risk Factors:  Nutrition Risk Factors: Overweight      Nutrition Plan  Interventions  Diet Consult: Completed    Other Nutrition Intervention: Therapist/Pt Discussion    Initial Rate Your Plate Score: 47    Education Completed  Nutrition Education Completed: Risk factor overview;Low Saturated fat diet;Low sodium diet      Goals  Nutrition Goals (Next 30 days): Patient will lose weight      Goals Met  Nutrition Goals Met: Provided Rate your Plate Survey;Reviewed Dietitian schedule;Patient can identify their risk factors for CAD;Patient knows appropriate portion size;Patient states following a low saturated fat diet;Patient follows a low sodium diet      Height,  "Weight, and  BMI  Weight: 223 lb 1.6 oz (101.2 kg)  Height: 5' 10\" (1.778 m)  BMI: 32.01      Nutrition Follow-up  Follow-up/Discharge: Patient states that he is grilling most of his meats, utilizing chicken and pork.  Patient states that he is following a low sodium diet and is not eating out much.  Patient did meet with the dietician.         Other Risk Factors  Other Risk Factor Assessment: Reassessment      HTN Risk Factor: NA      Pre Exercise BP: 112/70  Post Exercise BP: 104/60    Tobacco Risk Factor: Tobacco      Current Use:: Quit 7/10/19      Tobacco Plan  Tobacco Goals  Tobacco Goals: Patient remain tobacco free      Goals Met  Tobacco Goals Met: Patient remain tobacco free      Tobacco Interventions  Tobacco Interventions: Therapist/patient discussion;Provide written material      Tobacco Education Completed  Tobacco Education Completed: Health benefits of tobacco cessation;Risk factor overview      Risk Factor Follow-up   Follow-up/Discharge: Pt has recently quit smoking. Does report having some urges, to help with that he will take and eat a few skittles and then start doing something else and goes on with his day.  Has been decreasing the amount of skittles that he has been having.     PSYCHOSOCIAL  Psychosocial Assessment: Reassessment       [facility] Q of L Summary Score: 18      PHQ-9 Total Score: 2      Psychosocial Risk Factor: NA      Psychosocial Plan  Interventions  Interventions: Provide written material      Education Completed  Education Completed: Relaxation/Coping Techniques      Goals  Goals (Next 30 days): Patient demonstrates understanding of stress, no goals identified for the next 30 days      Goals Met  Goals Met: Identified Support system;Identify stressors;Practicing stress management skills      Psychosocial Follow-up  Follow-up/Discharge: Patient denies stress.  Patient does enjoy working out in his garage.             Patient involved in Goal setting?: Yes      Signature: " _____________________________________________________________    Date: __________________    Time: __________________

## 2021-06-02 NOTE — PROGRESS NOTES
ITP ASSESSMENT   Assessment Day: 90 Day    Session Number: 29  Precautions: standard cardiac    Diagnosis: Stent    Risk Stratification: High    Referring Provider: Itz Alicea MD   ITP:Dr. Hoover  EXERCISE  Exercise Assessment: Reassessment                              Exercise Plan  Goals Next 30 days  ADL'S: Continue to do intervals in cardiac rehab 2x/week for 30+ min. to increase stamina    Leisure: Pt wants to try intervals on  nustep with arms and arm erg 2x/week for 10+ min. in cardiac rehab to help with weight loss, goal to lose 5lbs in 30 days. current weight is 226.8lb    Work: Resume indoor painting wihtout symtpoms.      Education Goals: All goals in this section met    Education Goals Met: Patient can state cardiac s/s and appropriate emergency response.;Has system for taking medication.;Medication review.                          Goals Met  60 day ADL'S goals met: Pt is tolerating doing intervals on treadmill 1-2x/week for 30+ minutes in cardiac rehab    60 day Leisure goals met: Pt is utilizing the nustep with arms 1-2x/week for 10 minutes to assist with wt loss.      60 day Work goals met: Pt has done prep work for indoor painting, plans to start painting this weekend.     60 Day Progression: Pt has progressed to 5.7 MET level on treadmill with interval training.      30 day ADL'S goals met: Resumed mowing the lawn for 10 min. at a time without symptoms    30 day Leisure goals met: Resumed exercising 2-3 x/week for 15-20 minutes outside of cardiac rehab    30 day Work goals met: Resumed bowling 1 game without symptoms    30 Day Progression: Patient has reached 4.1 MET level on nustep and 4.9 MET level on treadmill.      Initial ADL's goals met: Resumed carrying <25 lbs     Initial Leisure goals met: Goal not met: Exercising 2-3 x/week for 15-20 minutes outside of cardiac rehab    Intial Work goals met: Pt is independent in all work duties.    Initial Progression: Patient had symtpoms of  low blod pressure and symptomatic with freq. PVCs, now has had some medication changes and is feeling better and wanting to continue to work on increasing intensities in cardiac rehab.  Patient is currently doing 2.8 METs on nustep,  3.3 METs on treadmill and 4.5 METs on stairs.      Exercise Prescription  Exercise Mode: Treadmill;Bike;Nustep;Arm Erg.;Stairs;Hallway Walking    Frequency: 2x/week    Duration: 40-45 min    Intensity / THR: 20-30 beats above resting heart rate    RPE 11-14  Progression / Met level: 5.8-6.5    Resistive Training?: No (pt does wts at home)      Current Exercise (mins/week): 190      Interventions  Home Exercise:  Mode: walking, treadmill    Frequency: 3x/week    Duration: 30-60 min      Education Material : Provide written material;Offer educational classes;Individual education and counseling      Education Completed  Exercise Education Completed: Signs and Symptoms;Medication review;RPE;Emergency Plan;Home Exercise;Warm up/cool down;FITT Principles;BP/HR Reponse to exercise;Stretching;Strength training;Cardiac Anatomy;Benefits of Exercise;End point of exercise              Exercise Follow-up/Discharge  Follow up/Discharge: Skilled therapy needed to monitor pt's ECG and tolerance to increasing workloads on nustep and arm erg to assist with wt loss.   NUTRITION  Nutrition Assessment: Reassessment      Nutrition Risk Factors:  Nutrition Risk Factors: Overweight      Nutrition Plan  Interventions  Diet Consult: Completed    Other Nutrition Intervention: Diet Class;Therapist/Pt Discussion;Provide with Written Material    Initial Rate Your Plate Score: 47  Follow up Rate Your Plate: 57    Education Completed  Nutrition Education Completed: Risk factor overview;Low Saturated fat diet;Low sodium diet      Goals  Nutrition Goals (Next 30 days): Patient will lose weight      Goals Met  Nutrition Goals Met: Provided Rate your Plate Survey;Reviewed Dietitian schedule;Patient can identify their risk  "factors for CAD;Patient knows appropriate portion size;Patient states following a low saturated fat diet;Patient follows a low sodium diet      Height, Weight, and  BMI  Weight: 226 lb 12.8 oz (102.9 kg)  Height: 5' 10\" (1.778 m)  BMI: 32.54      Nutrition Follow-up  Follow-up/Discharge: Patient states that he is grilling most of his meats, utilizing chicken and pork.  Patient states that he is following a low sodium diet and is not eating out much.  Patient did meet with the dietician. Pt reports he is being consisent with his heart healthy diet.         Other Risk Factors  Other Risk Factor Assessment: Reassessment      HTN Risk Factor: NA      Pre Exercise BP: 104/62  Post Exercise BP: 94/64        Tobacco Risk Factor: Tobacco      Current Use:: Quit 7/10/19      Tobacco Plan  Tobacco Goals  Tobacco Goals: Patient remain tobacco free      Goals Met  Tobacco Goals Met: Patient remain tobacco free      Tobacco Interventions  Tobacco Interventions: Therapist/patient discussion;Provide written material      Tobacco Education Completed  Tobacco Education Completed: Health benefits of tobacco cessation;Risk factor overview      Risk Factor Follow-up   Follow-up/Discharge: Pt reports his cravings for tobacco occur less often and that he is managing them well. Remains tobacco free.     PSYCHOSOCIAL  Psychosocial Assessment: Reassessment       [facility] Q of L Summary Score: 18      PHQ-9 Total Score: 2      Psychosocial Risk Factor: NA      Psychosocial Plan  Interventions  Interventions: Provide written material      Education Completed  Education Completed: Relaxation/Coping Techniques      Goals  Goals (Next 30 days): Patient demonstrates understanding of stress, no goals identified for the next 30 days      Goals Met  Goals Met: Identified Support system;Identify stressors;Practicing stress management skills      Psychosocial Follow-up  Follow-up/Discharge: Pt denies stress.  Pt states he enjoys \"tinkering\" on " projects, plans to start woodworking projects soon.             Patient involved in Goal setting?: Yes      Signature: _____________________________________________________________    Date: __________________    Time: __________________

## 2021-06-02 NOTE — PROGRESS NOTES
ITP ASSESSMENT   Assessment Day: 120 Day    Session Number: 36  Precautions: Standard cardiac sx/sx     Diagnosis: Stent    Risk Stratification: High    Referring Provider: Itz Alicea MD   ITP: Dr. Hoover  EXERCISE  Exercise Assessment: Discharge       6 Minute Walk Test   Pre   Pre Exercise HR: 79                    Pre Exercise BP: 104/72      Peak  Peak HR: 105                   Peak BP: 128/68    Peak feet: 1700    Peak O2 SAT: 100    Peak RPE: 3    Peak MPH: 3.22      Symptoms:  Peak Symptoms: denies cv sx/sx       5 mins. Post  5 Min Post HR: 78    5 Min Post BP: 100/62                           Exercise Plan    Education Goals: All goals in this section met    Education Goals Met: Patient can state cardiac s/s and appropriate emergency response.;Has system for taking medication.;Medication review.                          Goals Met  90 day ADL'S goals met: Goal #1: MET:Pt continued to do intervals at cardiac rehab 2x/week and also at home 1-2x/week for 30+ minutes at a 6.3 MET level without any complications. Pt has noticed an increase in endurance and stamina.     90 day Leisure goals met: Goal #2 Sort of MET. Pt requested to continue to do intervals on the TM but has been utilizing the arm ergometer per program at a peak MET level of >3 METS for 20 minutes without any complication to help increase upper body strength. Pt unfortunately has maintained if not gained 1 lb over the past month but he continues to work on interval training and diet changes to help with weight loss.     No data recorded  90 Day Progress: Pt has reached a 6.3 MET level for 33 minutes on the TM utilizing interval training. Pt has reached a 4.1 MET for 10-20 min on bikes. Pt has reached a 4.7 MET on the stairs for 5 minutes and a >3 MET on the arm ergometer for 20 minutes. Pt has reached his goals and is back to activities. PT was feeling it in his calves with speed on interval so adjusted the elevation and took the speed  down and it is mor comfortable. Pt also has limited ROM on R shoulder- limited w/weights, arm ergometer okay. PT reached MET Level goals.       60 day ADL'S goals met: Pt is tolerating doing intervals on treadmill 1-2x/week for 30+ minutes in cardiac rehab    60 day Leisure goals met: Pt is utilizing the nustep with arms 1-2x/week for 10 minutes to assist with wt loss.      60 day Work goals met: Pt has done prep work for indoor painting, plans to start painting this weekend.     60 Day Progression: Pt has progressed to 5.7 MET level on treadmill with interval training.      30 day ADL'S goals met: Resumed mowing the lawn for 10 min. at a time without symptoms    30 day Leisure goals met: Resumed exercising 2-3 x/week for 15-20 minutes outside of cardiac rehab    30 day Work goals met: Resumed bowling 1 game without symptoms    30 Day Progression: Patient has reached 4.1 MET level on nustep and 4.9 MET level on treadmill.      Initial ADL's goals met: Resumed carrying <25 lbs     Initial Leisure goals met: Goal not met: Exercising 2-3 x/week for 15-20 minutes outside of cardiac rehab    Intial Work goals met: Pt is independent in all work duties.    Initial Progression: Patient had symtpoms of low blod pressure and symptomatic with freq. PVCs, now has had some medication changes and is feeling better and wanting to continue to work on increasing intensities in cardiac rehab.  Patient is currently doing 2.8 METs on nustep,  3.3 METs on treadmill and 4.5 METs on stairs.      Exercise Prescription    Intensity / THR: 20-30 beats above resting heart rate ([place] 133-147bpm)        Current Exercise (mins/week): 195      Interventions  Home Exercise:  Mode: Walking, Treadmill, rare weights- due to R shoulder ROM    Frequency: 4-6x/week    Duration: 30-60 minutes      Education Material : Educational videos;Provide written material;Individual education and counseling;Offer educational classes      Education  Completed  Exercise Education Completed: Cardiac Anatomy;Signs and Symptoms;Medication review;RPE;Emergency Plan;Home Exercise;Warm up/cool down;FITT Principles;BP/HR Reponse to exercise;Stretching;Strength training;Benefits of Exercise;End point of exercise              Exercise Follow-up/Discharge  Follow up/Discharge: Pt completed cardiac rehab recommended 36 sessions and does feel better both physically and emotionally since starting. His confidence has improved greatly. PT has made great changes to his heart health including now diligent with aerobic exercising, quit smoking and has made diet changes and is optimistic about the future and motivated to continue with these changes. Pt feels he is back to all activities without any problems and more endurance. Pt has utilized interval training in cardiac rehab and at home. Pt had improvement in his surveys and great improvement in 6 min walk. Pt has reached MET level goals of >6 METS and is very pleased with progress. Pt will be utilizing TM and walking with interval training and straight aerobic exercise at home. Pt is compliant with medications. Pt is back to all activites without complications.    NUTRITION  Nutrition Assessment: Discharge      Nutrition Risk Factors:  Nutrition Risk Factors: Overweight      Nutrition Plan  Interventions  Diet Consult: Completed    Other Nutrition Intervention: Therapist/Pt Discussion;Provide with Written Material    Initial Rate Your Plate Score: 47    Follow-Up Rate Your Plate Score: 57      Education Completed  Nutrition Education Completed: Low Saturated fat diet;Risk factor overview;Low sodium diet;Weight management          Goals Met  Nutrition Goals Met: Patient can identify their risk factors for CAD;Patient follows a low sodium diet;Completed Nutritional Risk Screen;Provided Rate your Plate Survey;Reviewed Dietitian schedule;Patient states following a low saturated fat diet;Patient knows appropriate portion size;Rate  "Your Plate Survey Score Improved      Height, Weight, and  BMI  Weight: 227 lb 6.4 oz (103.1 kg)  Height: 5' 10\" (1.778 m)  BMI: 32.63      Nutrition Follow-up  Follow-up/Discharge: Pt feels he is following a heart healthy diet 75% of the time. Pt has cut down on his portions and is eating more chicken and less red meat. Pt is choosing healthier options with eating out. Pt does admit he needs to increase his water intake. Pt is watching is sodium and choosing low sodium options. Pt met with the dietician. Pt frusterated with his weight despite watching portions and increasing exercise but again he did recently quit smoking which definitely can play a part in it. Pt is pleased with his progress and doesn't have any further questions at this time. Pt will continue with his weight loss goal down to 200lb.          Other Risk Factors  Other Risk Factor Assessment: Discharge      HTN Risk Factor: NA      Pre Exercise BP: 104/68  Post Exercise BP: 94/60 (asymptomatic)        Tobacco Risk Factor: Tobacco      Initial Use:: None  Current Use:: Quit 7/10/2019      Tobacco Plan      Goals Met  Tobacco Goals Met: Patient remain tobacco free      Tobacco Interventions  Tobacco Interventions: Therapist/patient discussion;Provide written material;Offer educational videos;Offer class on risk factor modification      Tobacco Education Completed  Tobacco Education Completed: Identifies triggers;Health benefits of tobacco cessation;Risk factor overview      Risk Factor Follow-up   Follow-up/Discharge: Pt continues to decrease in his urges for tobacco and feels when they do come on he redirects himself quickly. Pt continues to remain motivated to maintain with his smoking cessation. Resting and Exercise bp WNL.     PSYCHOSOCIAL  Psychosocial Assessment: Discharge       [facility]KE Q of L Summary Score: 14      PHQ-9 Total Score: 1      Psychosocial Risk Factor: NA      Psychosocial Plan  Interventions  Interventions: Offer " educational videos and classes;Provide written material;Individual education and counseling      Education Completed  Education Completed: Relaxation/Coping Techniques        Goals Met  Goals Met: Identified Support system;Oriented to stress management classes;Identify stressors;Improvement in [employer] COOP score;Practicing stress management skills ([place] 18-14 and PHQ-9 2-1)      Psychosocial Follow-up  Follow-up/Discharge: Pt denies stress and does report feeling more confident since when he startred. Pt feels emotionally and physically better and is optimistic with the future. Pt is very pleased with his heart health changes that he has made. Pt enjoys exercise and working in the garage for relaxation. Pt has a good support system in friends and family. Pt reports his boss is very supportive also.              Patient involved in Goal setting?: Yes

## 2021-06-02 NOTE — PROGRESS NOTES
[facility] Home Exercise Program/Discharge Summary  You have reached a 6.3 MET level and have completed 36 sessions of Cardiac Rehab.   Exercise Goals:   4-6x/week for aerobic exercise 30-60 minutes and 2-3x/week for strength training.   Modality Duration Intensity/  Rate of Perceived Exertion  OMNI Scale (1-10)   Warm-up 5 minutes 2-3   Walk 20-25 minutes 4-7   Treadmill 20-25 minutes 4-7   Cool Down 5 minutes 4-7   Strength Training *every other day 10-15 minutes 3 sets of 10 reps  Increase weights as tolerated based on your R shoulder   Stretching 5 minutes 2-3   Continuous Exercise Heart Rate Guidelines:   20-30bpm> RHR (Resting Heart Rate) or Karvonen (60 to 75: 133-147 bpm)  Interval Exercise Program:  MIIT(1-5minutes): 50-60%: 124-133 bpm and/or RPE-O of 4-7  HIIT (1-2 minutes): 80%: 152 bpm and/or RPE-O of 7-9  x5 cycles  Alternate between interval and straight aerobic exercise  Special Recommendations:    Continue to follow low fat, low salt, heart healthy diet.    Continue to follow up with your doctors/providers as recommended (e.g cholesterol).    A well rounded exercise program will included aerobic/cardiovascular exercise (e.g like walking, biking, or swimming ), strength training (e.g. free weights, exercise bands, or weight machines) and stretching program.   Stop Exercise!!! If any of the following occur:    Angina/chest pain    Dizziness    Excessive perspiration/cold sweats    Abnormal shortness of breath    Changes in heart rate (slow, fast, irregular)    Sudden fatigue or numbness    Nausea  Also...    Avoid extreme temperatures - exercise indoors if necessary:   Temp+ Humidity >160, Temp-Wind Chill <20    Wait at least 1 hour after a meal before strenuous activity    Do not exercise if you have a fever or are ill    Wear comfortable, supportive athletic clothing and shoes.  You are now on your way to a heart healthy lifestyle on your own. You can do it!

## 2021-06-03 VITALS — WEIGHT: 227.3 LBS | BODY MASS INDEX: 32.61 KG/M2

## 2021-06-03 VITALS — WEIGHT: 222.5 LBS | BODY MASS INDEX: 31.93 KG/M2

## 2021-06-03 VITALS — WEIGHT: 227.4 LBS | BODY MASS INDEX: 32.63 KG/M2

## 2021-06-03 VITALS — BODY MASS INDEX: 31.95 KG/M2 | WEIGHT: 222.7 LBS

## 2021-06-03 VITALS — WEIGHT: 221 LBS | BODY MASS INDEX: 31.71 KG/M2

## 2021-06-03 VITALS — WEIGHT: 217.8 LBS | BODY MASS INDEX: 31.25 KG/M2

## 2021-06-03 VITALS — WEIGHT: 225.3 LBS | BODY MASS INDEX: 32.33 KG/M2

## 2021-06-03 VITALS — BODY MASS INDEX: 32.27 KG/M2 | WEIGHT: 224.9 LBS

## 2021-06-03 VITALS
RESPIRATION RATE: 20 BRPM | WEIGHT: 226.3 LBS | WEIGHT: 226.4 LBS | SYSTOLIC BLOOD PRESSURE: 106 MMHG | DIASTOLIC BLOOD PRESSURE: 70 MMHG | BODY MASS INDEX: 32.41 KG/M2 | HEART RATE: 68 BPM | BODY MASS INDEX: 32.47 KG/M2 | HEIGHT: 70 IN

## 2021-06-03 VITALS — BODY MASS INDEX: 32.67 KG/M2 | WEIGHT: 227.7 LBS

## 2021-06-03 VITALS — BODY MASS INDEX: 31.16 KG/M2 | WEIGHT: 217.2 LBS

## 2021-06-03 VITALS — WEIGHT: 226.2 LBS | BODY MASS INDEX: 32.46 KG/M2

## 2021-06-03 VITALS — BODY MASS INDEX: 31.6 KG/M2 | WEIGHT: 220.2 LBS

## 2021-06-03 VITALS — WEIGHT: 223.6 LBS | BODY MASS INDEX: 32.08 KG/M2

## 2021-06-03 VITALS — WEIGHT: 218 LBS | BODY MASS INDEX: 31.28 KG/M2

## 2021-06-03 VITALS — BODY MASS INDEX: 32.01 KG/M2 | WEIGHT: 223.1 LBS

## 2021-06-03 VITALS — WEIGHT: 220.6 LBS | BODY MASS INDEX: 31.65 KG/M2

## 2021-06-03 VITALS — WEIGHT: 218.2 LBS | BODY MASS INDEX: 31.31 KG/M2

## 2021-06-03 VITALS — BODY MASS INDEX: 32.54 KG/M2 | WEIGHT: 226.8 LBS

## 2021-06-03 VITALS — BODY MASS INDEX: 32.63 KG/M2 | WEIGHT: 227.4 LBS

## 2021-06-03 VITALS — BODY MASS INDEX: 31.18 KG/M2 | WEIGHT: 217.3 LBS

## 2021-06-03 VITALS — BODY MASS INDEX: 31.75 KG/M2 | WEIGHT: 221.3 LBS

## 2021-06-03 VITALS — WEIGHT: 214.4 LBS | HEIGHT: 70 IN | BODY MASS INDEX: 30.69 KG/M2

## 2021-06-03 VITALS — BODY MASS INDEX: 31.7 KG/M2 | WEIGHT: 220.9 LBS

## 2021-06-03 VITALS — BODY MASS INDEX: 32 KG/M2 | WEIGHT: 223 LBS

## 2021-06-03 VITALS — WEIGHT: 219.5 LBS | BODY MASS INDEX: 31.49 KG/M2

## 2021-06-03 VITALS — BODY MASS INDEX: 31.42 KG/M2 | WEIGHT: 219 LBS

## 2021-06-03 VITALS — BODY MASS INDEX: 31.48 KG/M2 | WEIGHT: 219.4 LBS

## 2021-06-03 VITALS — WEIGHT: 216 LBS | BODY MASS INDEX: 30.92 KG/M2 | HEIGHT: 70 IN

## 2021-06-03 VITALS — WEIGHT: 218.8 LBS | BODY MASS INDEX: 31.39 KG/M2

## 2021-06-03 VITALS — BODY MASS INDEX: 32.34 KG/M2 | WEIGHT: 225.4 LBS

## 2021-06-03 VITALS — BODY MASS INDEX: 31.93 KG/M2 | WEIGHT: 222.5 LBS

## 2021-06-03 VITALS — BODY MASS INDEX: 31.57 KG/M2 | WEIGHT: 220 LBS

## 2021-06-03 VITALS — WEIGHT: 225.9 LBS | BODY MASS INDEX: 32.41 KG/M2

## 2021-06-03 VITALS — WEIGHT: 222.4 LBS | BODY MASS INDEX: 31.91 KG/M2

## 2021-06-03 VITALS — BODY MASS INDEX: 31.47 KG/M2 | WEIGHT: 219.3 LBS

## 2021-06-04 VITALS
HEART RATE: 81 BPM | BODY MASS INDEX: 33.04 KG/M2 | HEIGHT: 71 IN | WEIGHT: 236 LBS | DIASTOLIC BLOOD PRESSURE: 70 MMHG | OXYGEN SATURATION: 98 % | SYSTOLIC BLOOD PRESSURE: 110 MMHG

## 2021-06-04 VITALS
BODY MASS INDEX: 33.04 KG/M2 | DIASTOLIC BLOOD PRESSURE: 78 MMHG | HEIGHT: 71 IN | RESPIRATION RATE: 16 BRPM | SYSTOLIC BLOOD PRESSURE: 110 MMHG | WEIGHT: 236 LBS | HEART RATE: 80 BPM

## 2021-06-09 NOTE — TELEPHONE ENCOUNTER
Wellness Screening Tool  Symptom Screening:  Do you have one of the following NEW symptoms:    Fever (subjective or >100.0)?  No    A new cough?  No    Shortness of breath?  No     Chills? No     New loss of taste or smell? No     Generalized body aches? No     New persistent headache? No     New sore throat? No     Nausea, vomiting, or diarrhea?  No    Within the past 3 weeks, have you been exposed to someone with a known positive illness below:    COVID-19 (known or suspected)?  No    Chicken pox?  No    Mealses?  No    Pertussis?  No    Patient notified of visitor policy- They may have one person accompany them to their appointment, but they will need to wear a mask and will be screened upon arrival for symptoms: Yes  Pt informed to wear a mask: Yes  Pt notified if they develop any symptoms listed above, prior to their appointment, they are to call the clinic directly at [phone] for further instructions.  Yes  Patient's appointment status: Patient will be seen in clinic as scheduled on 7/7/20.

## 2021-06-10 NOTE — PROGRESS NOTES
Office Visit - Physical   Jamie Charles   50 y.o.  male    Date of visit: 8/14/2020  Physician: Ji Albright MD     Assessment and Plan   1. Routine general medical examination at a health care facility  This is a 50-year-old man with issues as discussed below.  Ongoing healthy lifestyle discussed and recommended.    2. CAD, STEMI '19, DAVID RCA  Continue secondary prevention, follows with cardiology  - HM2(CBC w/o Differential)  - Lipid Cascade  - Comprehensive Metabolic Panel    3. Ischemic cardiomyopathy  Section fraction is normal    4. Essential hypertension with goal blood pressure less than 140/90  Blood pressure okay continue same    5. Mixed hyperlipidemia  Continue statin    6. Encounter for screening colonoscopy  He would like to defer colonoscopy until the new calendar year, will contact us for referral    7. Screen for colon cancer    8. Dyshidrotic eczema  Discussed risks associated with clobetasol use and importance of avoiding face or intertriginous areas and to not use for more than 7 to 10 days in a row  - clobetasoL (TEMOVATE) 0.05 % ointment; Apply topically 2 (two) times a day.  Dispense: 60 g; Refill: 1    9. Screening for diabetes mellitus  - Glycosylated Hemoglobin A1c    10. Screening for prostate cancer  - PSA (Prostatic-Specific Antigen), Annual Screen    11. Immunization due  - Tdap vaccine,  6yo or older,  IM  - Varicella Zoster, Recombinant Vaccine IM  - Varicella Zoster, Recombinant Vaccine IM; Future      The following high BMI interventions were performed this visit: encouragement to exercise    Return in about 6 months (around 2/14/2021) for recheck.     Chief Complaint   Chief Complaint   Patient presents with     Annual Exam     Skin Problem     dry skin        Patient Profile   Social History     Social History Narrative    Lives with his girlfriend/partner, Deepthi.  Works in [employer], industrial laundry.          Past Medical History   Patient Active Problem List    Diagnosis     Essential hypertension with goal blood pressure less than 140/90     Mixed hyperlipidemia     Ischemic cardiomyopathy     CAD, STEMI '19, DAVID RCA       Past Surgical History  He has a past surgical history that includes Coronary angioplasty with stent (07/10/2019); Coronary Angiogram (N/A, 7/10/2019); and Left Heart Catheterization with Left Ventriculogram (N/A, 7/10/2019).     History of Present Illness   This 50 y.o. old man comes in to [facility] care and for annual physical.  Medical history was reviewed come electronic medical record was obtained reflectance no.  Overall fairly healthy.  History of myocardial infarction in 2019.  He had culprit lesion in the right coronary artery and this was removed and drug-eluting stent placed.  He is now off dual antiplatelet therapy.  He is on high-dose statin.  He has no chest pain excellent exercise capacity.  His blood pressures been well controlled.  He has been trying to lose weight although he quit smoking is been hard since he quit smoking.  He has a rash on his hands and legs that he would like me to look at.    Review of Systems: A comprehensive review of systems was negative except as noted.     Medications and Allergies   Current Outpatient Medications   Medication Sig Dispense Refill     aspirin 81 mg chewable tablet Chew 1 tablet (81 mg total) daily.  0     atorvastatin (LIPITOR) 80 MG tablet Take 1 tablet (80 mg total) by mouth at bedtime. 90 tablet 3     diphenhydrAMINE (BENADRYL) 25 mg capsule Take 25-50 mg by mouth every 6 (six) hours as needed for itching.       loratadine (CLARITIN) 10 mg tablet Take 10 mg by mouth daily as needed for allergies.       magnesium gluconate (MAGONATE) 27 mg magnesium (500 mg) Tab tablet Take 2 tablets (54 mg total) by mouth at bedtime.  0     metoprolol succinate (TOPROL-XL) 25 MG Take 1 tablet (25 mg total) by mouth daily. 90 tablet 3     clobetasoL (TEMOVATE) 0.05 % ointment Apply topically 2 (two) times a  "day. 60 g 1     No current facility-administered medications for this visit.      No Known Allergies     Family and Social History   Family History   Problem Relation Age of Onset     Diabetes Mother      Diverticulitis Father      No Medical Problems Sister      No Medical Problems Brother      No Medical Problems Brother         Social History     Tobacco Use     Smoking status: Former Smoker     Packs/day: 0.50     Years: 32.00     Pack years: 16.00     Types: Cigarettes     Start date: 1987     Last attempt to quit: 7/10/2019     Years since quittin.0     Smokeless tobacco: Former User     Quit date: 1999   Substance Use Topics     Alcohol use: Yes     Alcohol/week: 12.0 standard drinks     Types: 12 Standard drinks or equivalent per week     Drug use: Never        Physical Exam   General Appearance:   No acute distress    /70 (Patient Site: Left Arm, Patient Position: Sitting, Cuff Size: Adult Regular)   Pulse 81   Ht 5' 10.5\" (1.791 m)   Wt (!) 236 lb (107 kg)   SpO2 98%   BMI 33.38 kg/m      EYES: Eyelids, conjunctiva, and sclera were normal. Pupils were normal. Cornea, iris, and lens were normal bilaterally.  HEAD, EARS, NOSE, MOUTH, AND THROAT: Head and face were normal. Hearing was normal to voice and the ears were normal to external exam. Nose appearance was normal and there was no discharge. Oropharynx was normal.  NECK: Neck appearance was normal. There were no neck masses and the thyroid was not enlarged.  RESPIRATORY: Breathing pattern was normal and the chest moved symmetrically.  Percussion/auscultatory percussion was normal.  Lung sounds were normal and there were no abnormal sounds.  CARDIOVASCULAR: Heart rate and rhythm were normal.  S1 and S2 were normal and there were no extra sounds or murmurs. Peripheral pulses in arms and legs were normal.  Jugular venous pressure was normal.  There was no peripheral edema.  GASTROINTESTINAL: The abdomen was normal in contour.  Bowel " sounds were present.  Percussion detected no organ enlargement or tenderness.  Palpation detected no tenderness, mass, or enlarged organs.   MUSCULOSKELETAL: Skeletal configuration was normal and muscle mass was normal for age. Joint appearance was overall normal.  LYMPHATIC: There were no enlarged nodes.  SKIN/HAIR/NAILS: Skin color was normal.  Eczematous lesions on his hands arms and legs.  Hair and nails were normal.  NEUROLOGIC: The patient was alert and oriented to person, place, time, and circumstance. Speech was normal. Cranial nerves were normal. Motor strength was normal for age. The patient was normally coordinated.  PSYCHIATRIC:  Mood and affect were normal and the patient had normal recent and remote memory. The patient's judgment and insight were normal.       Additional Information        Ji Albright MD  Internal Medicine  Contact me at [phone]

## 2021-06-12 NOTE — PROGRESS NOTES
Immunizations Administered     Name       ZOSTER, RECOMBINANT, IM        Patient in clinic today for his 2nd shingrix vaccine.   He tolerated the injection well and had no further questions at this time.  Elyse CLINTON CMA/LUCILLE....................10:11 AM

## 2021-06-17 NOTE — PATIENT INSTRUCTIONS - HE
Patient Instructions by Chaz Cadena DO at 9/20/2019  7:50 AM     Author: Chaz Cadena DO Service: -- Author Type: Physician    Filed: 9/20/2019  8:12 AM Encounter Date: 9/20/2019 Status: Addendum    : Chaz Cadena DO (Physician)    Related Notes: Original Note by Chaz Cadena DO (Physician) filed at 9/20/2019  8:07 AM         It was a pleasure to meet with you today in clinic.  Please do not hesitate to call the [facility] clinic with any questions or concerns at [phone].    Additional Provider Instructions:   Below is a list of any additional instructions from your provider:   1. Continue current medications   2. Will stop Brilinita after July 10th.    3. Follow-up in July 2020.  4. Check lipids in November 2020 (fasting).  Stop into lab at [facility].        Sincerely,

## 2021-06-17 NOTE — PATIENT INSTRUCTIONS - HE
Patient Instructions by Rina Beaver NP at 8/5/2019  7:50 AM     Author: Rina Beaver NP Service: -- Author Type: Nurse Practitioner    Filed: 8/5/2019  8:22 AM Encounter Date: 8/5/2019 Status: Addendum    : Rina Beaver NP (Nurse Practitioner)    Related Notes: Original Note by Rina Beaver NP (Nurse Practitioner) filed at 8/5/2019  7:59 AM       Jamie Charles,    It was a pleasure to see you today at the [facility].     My recommendations after this visit include:    - I decreased your Metoprolol Tartrate from 25 mg two times a day to 12.5 mg two times a day     - Please seek medical attention if recurrent chest pain/tightness/pressure/ or shortness of breath    - Follow up with Dr. Cadena in 4-6 weeks    - Please call [phone], if you have any questions or concerns    Rina Beaver CNP    Medication     o Take all your medications as prescribed  o Do not stop any medications without talking with a healthcare provider    Exercise      o Physical activity is important for overall health  o Set a goal of 150 minutes of exercise each week  o For example, 30 minutes of exercise 5 days each week.    o These 30 minutes can be broken into shorter periods of 15 minutes twice daily or 10 minutes three times daily  o Start any exercise program slowly and work towards the goal of 150 minutes each week  o For example, you may start with 10 minutes and plan to add a few minutes each week as you get stronger   o Examples of exercise include walking, swimming, or biking  o Remember to stretch and stay hydrated with exercise    Diet     o A heart healthy diet includes:  o A variety of fruits and vegetables  o Whole grains  o Low-fat dairy (fat-free, 1% fat, and low-fat)  o Lean meats and poultry without skin   o Fish (eat fish 2 times each week)  o Nuts  o Limit saturated fat to about 13 grams each day (based on a 2000 calorie diet)  o Limit red meat  o Limit sugars (sweets and sugary  beverages)  o Limit your portion sizes  o Do not add salt to your food when cooking or at the table  o Limit alcohol intake (no more than 1 drink each day for women or 2 drinks each day for men)    Weight Loss     o Work on losing weight with diet and exercise  o You BMI (body mass index) should be between 18.5-24.9  o This is a calculation of your weight and height  o Please ask your healthcare provider for your BMI    Manage Other Chronic Health Conditions     o Control cholesterol  o Eat a diet low in saturated fat  o Exercise   o Take a statin medication as prescribed  o Manage blood pressure  o Eat a diet low in sodium  o Exercise  o Reduce stress  o Lose weight   o Take blood pressure medications as prescribed  o Control blood sugars if diabetic  o Monitor sugars and carbohydrates in your diet  o Lose weight   o Take diabetes medications as prescribed  o Follow-up with your primary care provider to make sure your blood sugars are well controlled    Stress Reduction     o Find time each day to relax  o Reading, listening to music, yoga, meditation, exercise, spending time with friends and family, volunteering   o Get 6-8 hours of sleep each night    Smoking Cessation     o Smoking causes numerous health problems including coronary artery disease  o It is never too late to quit  o Set realistic goals for quitting  o Decrease the number of cigarettes used each week  o Use nicotine gum or patches to help you quit    Information from the American Heart Association.  Please visit their website at www.heart.org    Patient Education   Eating Heart-Healthy Foods  Eating has a big impact on your heart health. In fact, eating healthier can improve several of your heart risks at once. For instance, it helps you manage weight, cholesterol, and blood pressure. Here are ideas to help you make heart-healthy changes without giving up all the foods and flavors you love.  Getting started    Talk with your healthcare provider  about eating plans, such as the DASH or Mediterranean diet. You may also be referred to a dietitian.    Change a few things at a time. Give yourself time to get used to a few eating changes before adding more.    Work to create a tasty, healthy eating plan that you can stick to for the rest of your life.    Goals for healthy eating  Below are some tips to improve your eating habits:    Limit saturated fats and trans fats. Saturated fats raise your levels of cholesterol, so keep these fats to a minimum. They are found in foods such as fatty meats, whole milk, cheese, and palm and coconut oils. Avoid trans fats because they lower good cholesterol as well as raise bad cholesterol. Trans fats are most often found in processed foods.    Reduce sodium (salt) intake. Eating too much salt may increase your blood pressure. Limit your sodium intake to 2,300 milligrams (mg) per day (the amount in 1 teaspoon of salt), or less if your healthcare provider recommends it. Dining out less often and eating fewer processed foods are two great ways to decrease the amount of salt you consume.    Managing calories. A calorie is a unit of energy. Your body burns calories for fuel, but if you eat more calories than your body burns, the extras are stored as fat. Your healthcare provider can help you create a diet plan to manage your calories. This will likely include eating healthier foods as well as exercising regularly. To help you track your progress, keep a diary to record what you eat and how often you exercise.  Choose the right foods  Aim to make these foods staples of your diet. If you have diabetes, you may have different recommendations than what is listed here:    Fruits and vegetables provide plenty of nutrients without a lot of calories. At meals, fill half your plate with these foods. Split the other half of your plate between whole grains and lean protein.    Whole grains are high in fiber and rich in vitamins and nutrients.  Good choices include whole-wheat bread, pasta, and brown rice.    Lean proteins give you nutrition with less fat. Good choices include fish, skinless chicken, and beans.    Low-fat or nonfat dairy provides nutrients without a lot of fat. Try low-fat or nonfat milk, cheese, or yogurt.    Healthy fats can be good for you in small amounts. These are unsaturated fats, such as olive oil, nuts, and fish. Try to have at least 2 servings per week of fatty fish, such as salmon, sardines, mackerel, rainbow trout, and albacore tuna. These contain omega-3 fatty acids, which are good for your heart. Flaxseed is another source of a heart-healthy fat.  More on heart-healthy eating  Read food labels  Healthy eating starts at the grocery store. Be sure to pay attention to food labels on packaged foods. Look for products that are high in fiber and protein, and low in saturated fat, cholesterol, and sodium. Avoid products that contain trans fat. And pay close attention to serving size. For instance, if you plan to eat two servings, double all the numbers on the label.  Prepare food right  A key part of healthy cooking is cutting down on added fat and salt. Look on the internet for lower-fat, lower-sodium recipes. Also, try these tips:    Remove fat from meat and skin from poultry before cooking.    Skim fat from the surface of soups and sauces.    Broil, boil, bake, steam, grill, and microwave food without added fats.    Choose ingredients that spice up your food without adding calories, fat, or sodium. Try these items: horseradish, hot sauce, lemon, mustard, nonfat salad dressings, and vinegar. For salt-free herbs and spices, try basil, cilantro, cinnamon, pepper, and rosemary.  Date Last Reviewed: 10/1/2017    5266-3297 [employer]. [address] [place] [address], [place] [address]. All rights reserved. This information is not intended as a substitute for professional medical care. Always follow your healthcare professional's  instructions.

## 2021-06-27 ENCOUNTER — HEALTH MAINTENANCE LETTER (OUTPATIENT)
Age: 51
End: 2021-06-27

## 2021-06-27 NOTE — PROGRESS NOTES
Progress Notes by Rnia Beaver NP at 8/5/2019  7:50 AM     Author: Rina Beaver NP Service: -- Author Type: Nurse Practitioner    Filed: 8/5/2019  9:39 AM Encounter Date: 8/5/2019 Status: Signed    : Rina Beaver NP (Nurse Practitioner)                 Click to link to [facility] Heart Care       [facility] HEART CARE NOTE      Assessment/Recommendations   1.  Coronary artery disease: Recent hospitalization with STEMI involving RCA.  Coronary angiogram showed 100% occlusion in proximal to mid RCA which was successfully treated with balloon angioplasty/thrombectomy and Onyz stend X1.  He was recommended to remain on dual antiplatelet therapy with aspirin 81 mg indefinitely and Brilinta 90 mg twice a day for 1 year.  We discussed about the importance of discussing with his cardiologist prior to stopping these medications for any reason.    Patient denies any chest pain or angina since recent stent placement.Risk factor modification and lifestyle management topics were discussed including managing comorbidities, weight loss, heart healthy diet, exercise, smoking cessation, stress reduction and alcohol use.  He has quit smoking since recent STEMI.  He remains abstinent from using alcohol.  He has initiated cardiac rehab-highly encouraged to resume    2.  Dyslipidemia: Jamie Charles is on high intensity statin therapy with atorvastatin 80 mg daily. Most recent LDL is 150.  We discussed a diet low in saturated fat, weight loss, and exercise along with medication for better control of cholesterol.     3.  H/o Hypertension: Blood pressure today is 104/70.  He reports occasional lightheadedness.  Recent ED visit with hypotension, lightheadedness and diaphoresis.  He continues to feel sweaty, fatigue and lightheadedness in 46 hours after taking his metoprolol.  Decreasing Metroprolol tartrate from 25 mg twice a day to 12.5 mg twice a day.  If he does not tolerate the lower dose of Metroprolol tartrate, will  consider switching him to low-dose of carvedilol.    6.  History of NSVT: Heart rate today in 80s.  No further recurrence of NSVT in cardiac rehab.  On metoprolol 25 mg twice a day.  Reports heart palpitation more after taking his metoprolol.    5.  Diabetes: Most recent A1C is 6.0.  We discussed the importance of tightly controlled blood sugars to minimize risk of worsening coronary artery disease. Defer to PCP for diabetes managment.    Follow up with Dr. Cadena in 4 to 6 weeks     History of Present Illness    Jamie Charles is 49 y.o. with a past medical history of hypertension, mixed hyperlipidemia, NSVT, ischemic cardiomyopathy, prediabetes, and recent acute STEMI who is seen at [facility] for post coronary intervention follow up.  Patient was hospitalized from July 10 through July 13 with acute STEMI involving RCA.  He underwent coronary angiogram and showed 100% occlusion in RCA which was successfully treated with a drug-eluting stent.  He was initiated on a low-dose of ACE inhibitor and beta-blocker.  However, lisinopril was discontinued due to low blood pressure.  Post coronary intervention, he developed intermittent AV block and rapid NSVT for which he was kept in ICU for monitoring.  He has not had further recurrence.  Dual antiplatelet therapy is being used with aspirin indefinitely and ticagrelor for 1 year.  His echocardiogram showed normal left ventricular systolic function with EF of 65% with no significant valve abnormalities noted.  Per echocardiogram, visually estimated ejection fraction was 55% with some wall motion abnormalities noted in the left ventricle.  His BMP is stable.  His magnesium is stable around 1.9.  He was started on magnesium supplement.    Patient got rehospitalized on July 29 with lightheadedness, diaphoresis, frequent PVCs with bigeminy and trigeminy, and hypotension in cardiac rehab.  Nuclear stress test on 7/30/2019 showed medium sized area of transmural  infarction in mid to basal inferior and inferolateral segments of the LV but no ischemia seen.  Left ventricular ejection fraction was estimated around 50%.  His troponins were negative.  EKG showed resolution of ST elevation compared to previous EKG.  His symptoms were thought to be due to dehydration and EtOH use.  Patient was given IV fluids which helped improve his symptoms.  He was recommended to limit his alcohol intake.    Today, patient reports that overall he feels improvement since recent admission.  However, he continues to have symptoms of lightheadedness with sweating, fatigue, and heart palpitation after he takes his metoprolol.  He denies shortness of breath, dyspnea on exertion, orthopnea, PND, chest pain, abdominal fullness/bloating and lower extremity edema are denies bleeding complications.  Patient is wondering if he should resume his physical therapy given his symptoms.  He has not had further chest pressure, shortness of breath and right arm tingling sensation since recent stent placement.    Coronary Angiogram- reviewed:  Results for orders placed during the hospital encounter of 07/10/19   Cardiac Catheterization [CATH01] 07/10/2019    Narrative   Prox RCA to Mid RCA lesion is 100% stenosed.    Radial access was not utilized for     There are wall motion abnormalities in the left ventricle.    The ejection fraction is greater than 55% by visual estimate.     Successful PCI on RCA total occlusion using balloon, thrombectomy and [place]   stent     ECHO-Reviewed:   Results for orders placed during the hospital encounter of 07/10/19   Echo Complete [ECH10] 07/11/2019    Narrative   Normal left ventricular size and systolic function.    Left ventricle ejection fraction is normal. The calculated left   ventricular ejection fraction is 65%.    No previous study for comparison.    Normal right ventricular size and systolic function.    No hemodynamically significant valvular heart abnormalities.            Physical Examination Review of Systems   Vitals:    08/05/19 0750   BP: 104/70   Pulse: 80   Resp: 16   Temp: 98.1  F (36.7  C)     Body mass index is 30.99 kg/m .  Wt Readings from Last 3 Encounters:   08/05/19 216 lb (98 kg)   08/02/19 217 lb 12.8 oz (98.8 kg)   07/30/19 217 lb 14.4 oz (98.8 kg)       General Appearance:     Alert, cooperative and in no acute distress.   ENT/Mouth: membranes moist, no oral lesions or bleeding gums.      EYES:  no scleral icterus, normal conjunctivae   Neck: no carotid bruits or thyromegaly   Chest/Lungs:   lungs are clear to auscultation, no rales or wheezing, respirations unlabored   Cardiovascular:    Heart rate regular. Normal first and second heart sounds with no murmurs, rubs, or gallops; the carotid, radial and posterior tibial pulses are intact, Jugular venous pressure flat with no HJR, no edema bilateral lower extremities    Abdomen:  Soft, nontender, nondistended, bowel sounds present   Extremities: no cyanosis or clubbing   Skin:  Neurologic: warm, dry.  mood and affect are appropriate, alert and oriented x3     Puncture Site: Right radial site is soft with no bruising.  Radial pulses and Pedal pulses intact and symmetrical.  CMS intact.      General: Night Sweats  Eyes: WNL  Ears/Nose/Throat: WNL  Lungs: WNL  Heart: Shortness of Breath with activity, Irregular Heartbeat  Stomach: WNL  Bladder: Frequent Urination at Night  Muscle/Joints: WNL  Skin: WNL  Nervous System: Daytime Sleepiness  Mental Health: WNL     Blood: WNL     Medical History  Surgical History Family History Social History   Past Medical History:   Diagnosis Date   ? Acute ST elevation myocardial infarction (STEMI) involving other coronary artery of inferior wall (H)    ? CAD (coronary artery disease)    ? HLD (hyperlipidemia)     Past Surgical History:   Procedure Laterality Date   ? CORONARY ANGIOPLASTY WITH STENT PLACEMENT  07/10/2019    DAVID to pRCA for STEMI   ? CV CORONARY ANGIOGRAM N/A 7/10/2019     Procedure: Coronary Angiogram;  Surgeon: Itz Alicea MD;  Location: [facility] Cath Lab;  Service: Cardiology   ? CV LEFT HEART CATHETERIZATION WITH LEFT VENTRICULOGRAM N/A 7/10/2019    Procedure: Left Heart Catheterization with Left Ventriculogram;  Surgeon: Itz Alicea MD;  Location: [facility] Cath Lab;  Service: Cardiology    Family History   Problem Relation Age of Onset   ? Diabetes Mother     Social History     Socioeconomic History   ? Marital status: Single     Spouse name: Not on file   ? Number of children: Not on file   ? Years of education: Not on file   ? Highest education level: Not on file   Occupational History   ? Not on file   Social Needs   ? Financial resource strain: Not on file   ? Food insecurity:     Worry: Not on file     Inability: Not on file   ? Transportation needs:     Medical: Not on file     Non-medical: Not on file   Tobacco Use   ? Smoking status: Current Every Day Smoker     Packs/day: 0.50     Years: 32.00     Pack years: 16.00     Types: Cigarettes     Start date: 7/11/1987   ? Smokeless tobacco: Former User     Quit date: 7/11/1999   Substance and Sexual Activity   ? Alcohol use: Yes     Frequency: 2-3 times a week     Drinks per session: 5 or 6     Binge frequency: Monthly   ? Drug use: Never   ? Sexual activity: Yes     Partners: Female   Lifestyle   ? Physical activity:     Days per week: Not on file     Minutes per session: Not on file   ? Stress: Not on file   Relationships   ? Social connections:     Talks on phone: Not on file     Gets together: Not on file     Attends Restoration[employer] service: Not on file     Active member of club or organization: Not on file     Attends meetings of clubs or organizations: Not on file     Relationship status: Not on file   ? Intimate partner violence:     Fear of current or ex partner: Not on file     Emotionally abused: Not on file     Physically abused: Not on file     Forced sexual activity: Not on file   Other Topics  Concern   ? Not on file   Social History Narrative   ? Not on file          Medications  Allergies   Current Outpatient Medications   Medication Sig Dispense Refill   ? aspirin 81 mg chewable tablet Chew 1 tablet (81 mg total) daily.  0   ? atorvastatin (LIPITOR) 80 MG tablet Take 1 tablet (80 mg total) by mouth at bedtime. 90 tablet 3   ? coenzyme Q10 (COQ-10) 100 mg capsule Take 1 capsule (100 mg total) by mouth daily.  0   ? diphenhydrAMINE (BENADRYL) 25 mg capsule Take 25-50 mg by mouth every 6 (six) hours as needed for itching.     ? loratadine (CLARITIN) 10 mg tablet Take 10 mg by mouth daily as needed for allergies.     ? magnesium gluconate (MAGONATE) 27 mg magnesium (500 mg) Tab tablet Take 2 tablets (54 mg total) by mouth at bedtime.  0   ? metoprolol tartrate (LOPRESSOR) 25 MG tablet Take 0.5 tablets (12.5 mg total) by mouth 2 (two) times a day. 60 tablet 0   ? ticagrelor (BRILINTA) 90 mg Tab Take 1 tablet (90 mg total) by mouth 2 (two) times a day. 180 tablet 3     No current facility-administered medications for this visit.       No Known Allergies      Lab Results    Chemistry CBC BNP   Lab Results   Component Value Date    CREATININE 1.05 07/29/2019    BUN 14 07/29/2019     07/29/2019    K 4.4 07/29/2019     07/29/2019    CO2 25 07/29/2019     Creatinine (mg/dL)   Date Value   07/29/2019 1.05   07/12/2019 0.81   07/10/2019 1.10    Lab Results   Component Value Date    WBC 9.7 07/29/2019    HGB 13.8 (L) 07/29/2019    HCT 41.1 07/29/2019    MCV 90 07/29/2019     07/29/2019    Lab Results   Component Value Date     (H) 07/29/2019     BNP (pg/mL)   Date Value   07/29/2019 192 (H)        Rina Beaver [facility]        This note has been dictated using voice recognition software. Any grammatical, typographical, or context distortions are unintentional and inherent to the software

## 2021-06-28 NOTE — PROGRESS NOTES
Progress Notes by Chaz Cadena DO at 9/20/2019  7:50 AM     Author: Chaz Cadena DO Service: -- Author Type: Physician    Filed: 9/20/2019  8:24 AM Encounter Date: 9/20/2019 Status: Signed    : Chaz Cadena DO (Physician)           Click to link to [facility] Heart Care     [facility] HEART CARE NOTE    Assessment/Recommendations   Assessment:    1. Coronary artery disease.  No active angina.   2. Recent inferolateral ST segment elevation myocardial infarction (July 2019) s/p mild to proximal RCA stenosis.  Intermittent AV block prior to PCI.  Normal left sided coronary arteries.    3. Frequent PVCs and NSVT (Resolved).  4. Hyperlipidemia (LDL: 170, HLD:39)   5. Smoker, remission    Plan:  1. Metoprolol 12.5 mg two times a day for CAD and PVC (could switch to XL in future)  2. ASA 81 mg daily (life long), Brillinta 90 mg two times a day (1 year)   3. Atorvastatin 80 mg daily for HLD   4. Check lipid panel after 3 months of statin, Goal <70.  IF >70 will start Zetia 10 mg daily.   5. Follow-up in June/July 2020.    6. Continue with cardiac rehab.        History of Present Illness/Subjective    Mr. Jamie Charles is a 49 y.o. male recent ST elevated myocardial infarction involving the right coronary artery, history of nonsustained ventricular tachycardia, history of AV block with right coronary occlusion who presents in cardiology clinic in follow-up for coronary artery disease and history of myocardial infarction.    Patient was hospitalized in July 2019 which time I had the option to see the patient after his myocardial infarction.  He was expensing lightheadedness and diaphoresis at that time.  His metoprolol has subsequently been reduced and has noted resolution of his symptoms.  He did undergo nuclear stress testing on July 30, 2019 which was negative for ischemia.  He had a area of trans-mural infarct which could also be diaphragmatic attenuation.  Ejection fraction was  normal at the time.  Currently he is asymptomatic.  He is continued to exercise 3-5 times per week with his home treadmill and also a cardiac rehab.  Cardiac rehab notes of been reviewed and has had no issues.  Blood pressure and heart rates remain stable during cardiac rehab.  He denies any recent diaphoresis lightheadedness or palpitations.  Overall he feels he is doing excellent.  He is tolerating his aspirin and Brilinta.  He has very mild occasional dyspnea which is a side effect of his Brilinta.  He is on high-dose atorvastatin and tolerating well.    NM STRESS TESTIN2019    1.The exercise nuclear stress test is abnormal.    2.The patient's exercise capacity is normal achieving 10.3 METS.    3.Adequate negative exercise ECG for ischemia after 8 minutes and 40 seconds on standard Aaron protocol.    4.There is a medium sized area of transmural infarction in the mid to basilar inferior and inferolateral segment(s) of the left ventricle. No ischemia seen.    5.The left ventricular ejection fraction is 50%.    6.There is no prior study available.    7.The findings of this examination were communicated to the nursing staff at [facility] and Dr. Chaz Cadena.    ECHO: 2019    Normal left ventricular size and systolic function.    Left ventricle ejection fraction is normal. The calculated left ventricular ejection fraction is 65%.    No previous study for comparison.    Normal right ventricular size and systolic function.    No hemodynamically significant valvular heart abnormalities.    Coronary Angiogram:   Dominance: Right   Right Coronary Artery   Prox RCA to Mid RCA lesion is 100% stenosed. Culprit lesion. The lesion is high risk, located proximal to the major branch and thrombotic.The lesion was not previously treated.     Prox RCA to Mid RCA lesion   Thrombectomy   Aspiration.   Supplies used: KIT CATHETER INDIGO RX 140CM WITH LG LUMEN ASP TUBING   PCI   Supplies used: CATH EMERGE 2.5MM  X 12MM; STENT RESOLUTE MARCELLUS DE 2.7FR 4.59W68XQ RONYX [id number]; CATH EMERGE NC MR US 4.50MM X 15MM   There is a 0% residual stenosis post intervention.          Physical Examination Review of Systems   Vitals:    09/20/19 0750   BP: 106/70   Pulse: 68   Resp: 20     Body mass index is 32.49 kg/m .  Wt Readings from Last 3 Encounters:   09/16/19 (!) 223 lb 9.6 oz (101.4 kg)   09/13/19 (!) 223 lb (101.2 kg)   09/09/19 (!) 222 lb 8 oz (100.9 kg)     No intake or output data in the 24 hours ending 07/29/19 1911  General Appearance:   no distress, normal body habitus   ENT/Mouth: membranes moist, no oral lesions or bleeding gums.      EYES:  no scleral icterus, normal conjunctivae   Neck: no carotid bruits or thyromegaly   Chest/Lungs:   lungs are clear to auscultation, no rales or wheezing, no sternal scar, equal chest wall expansion    Cardiovascular:   Regular. Normal first and second heart sounds with no murmurs, rubs, or gallops; the carotid, radial and posterior tibial pulses are intact, Jugular venous pressure noraml, no edema bilaterally.   Abdomen:  no o tenderness; bowel sounds are present   Extremities: no cyanosis or clubbing.  Right radial cath site is without hematoma.  Normal distal pulses.  Unchanged   Skin: no xanthelasma, warm.    Neurologic: normal  bilateral, no tremors     Psychiatric: alert and oriented x3, calm     General: WNL  Eyes: WNL  Ears/Nose/Throat: WNL  Lungs: WNL  Heart: WNL  Stomach: WNL  Bladder: WNL  Muscle/Joints: WNL  Skin: WNL  Nervous System: WNL  Mental Health: WNL     Blood: WNL       Medical History  Surgical History Family History Social History   Past Medical History:   Diagnosis Date   ? Acute ST elevation myocardial infarction (STEMI) involving other coronary artery of inferior wall (H)    ? CAD (coronary artery disease)    ? HLD (hyperlipidemia)     Past Surgical History:   Procedure Laterality Date   ? CORONARY ANGIOPLASTY WITH STENT PLACEMENT  07/10/2019    DAVID to  pRCA for STEMI   ? CV CORONARY ANGIOGRAM N/A 7/10/2019    Procedure: Coronary Angiogram;  Surgeon: Itz Alicea MD;  Location: [facility] Cath Lab;  Service: Cardiology   ? CV LEFT HEART CATHETERIZATION WITH LEFT VENTRICULOGRAM N/A 7/10/2019    Procedure: Left Heart Catheterization with Left Ventriculogram;  Surgeon: Itz Alicea MD;  Location: [facility] Cath Lab;  Service: Cardiology    Family History   Problem Relation Age of Onset   ? Diabetes Mother     Social History     Socioeconomic History   ? Marital status: Single     Spouse name: Not on file   ? Number of children: Not on file   ? Years of education: Not on file   ? Highest education level: Not on file   Occupational History   ? Not on file   Social Needs   ? Financial resource strain: Not on file   ? Food insecurity:     Worry: Not on file     Inability: Not on file   ? Transportation needs:     Medical: Not on file     Non-medical: Not on file   Tobacco Use   ? Smoking status: Current Every Day Smoker     Packs/day: 0.50     Years: 32.00     Pack years: 16.00     Types: Cigarettes     Start date: 7/11/1987   ? Smokeless tobacco: Former User     Quit date: 7/11/1999   Substance and Sexual Activity   ? Alcohol use: Yes     Frequency: 2-3 times a week     Drinks per session: 5 or 6     Binge frequency: Monthly   ? Drug use: Never   ? Sexual activity: Yes     Partners: Female   Lifestyle   ? Physical activity:     Days per week: Not on file     Minutes per session: Not on file   ? Stress: Not on file   Relationships   ? Social connections:     Talks on phone: Not on file     Gets together: Not on file     Attends [employer] service: Not on file     Active member of club or organization: Not on file     Attends meetings of clubs or organizations: Not on file     Relationship status: Not on file   ? Intimate partner violence:     Fear of current or ex partner: Not on file     Emotionally abused: Not on file     Physically abused: Not on file      Forced sexual activity: Not on file   Other Topics Concern   ? Not on file   Social History Narrative   ? Not on file          Medications  Allergies   Scheduled Meds:    Continuous Infusions:  PRN Meds:. No Known Allergies      Lab Results    Chemistry/lipid CBC Cardiac Enzymes/BNP/TSH/INR   Lab Results   Component Value Date    CHOL 226 (H) 07/12/2019    HDL 36 (L) 07/12/2019    LDLCALC 150 (H) 07/12/2019    TRIG 202 (H) 07/12/2019    CREATININE 1.05 07/29/2019    BUN 14 07/29/2019    K 4.4 07/29/2019     07/29/2019     07/29/2019    CO2 25 07/29/2019    Lab Results   Component Value Date    WBC 9.7 07/29/2019    HGB 13.8 (L) 07/29/2019    HCT 41.1 07/29/2019    MCV 90 07/29/2019     07/29/2019    Lab Results   Component Value Date    TROPONINI 0.04 07/29/2019     (H) 07/29/2019    INR 1.07 07/29/2019

## 2021-06-29 NOTE — PROGRESS NOTES
Progress Notes by Chaz Cadena DO at 7/7/2020  8:30 AM     Author: Chaz Cadena DO Service: -- Author Type: Physician    Filed: 7/7/2020  9:43 AM Encounter Date: 7/7/2020 Status: Signed    : Chaz Cadena DO (Physician)           Click to link to [facility]     [facility] HEART CARE NOTE    Assessment/Recommendations   Assessment:    1. Coronary artery disease with prior proximal RCA disease s/p PCI.      2. Inferolateral ST segment elevation myocardial infarction (July 2019) s/p proximal RCA stenosis.  Complicated by ntermittent AV block prior to PCI.    3. Frequent PVCs and NSVT (improved with metoprolol).  4. Hyperlipidemia (LDL: 65- at goal, HLD:39)   5. Smoker, remission    Plan:  1. Switch to Metoprolol 25 mg XL daily for PVCs and CAD.   2. ASA 81 mg daily (life long)  3. Discontinue Brillinta 90 mg two times a day after 7/10/2020.   4. Atorvastatin 80 mg daily for HLD. LDL at goal and <70.    5. Continue exercise regiment (biking-new, treadmill) at 4-5 time per week.   6. Successfully quit smoking       History of Present Illness/Subjective    Mr. Jamie Charles is a 50 y.o. male with history of ST elevated myocardial infarction involving the right coronary artery (July 2019), history of nonsustained ventricular tachycardia, history of AV block with right coronary occlusion who presents in cardiology clinic in follow-up for coronary artery disease and history of myocardial infarction.      According to the patient he has felt well overall.  He has continued to exercise frequently including 4-5 times per week on his treadmill and bicycle.  He has successfully quit smoking but no relapses.  Said no significant bleeding with aspirin along with Brilinta.  He like to discontinue Brilinta has been more than 1 year which I agree with.  He has no significant side effects related to his atorvastatin and has a LDL less than 70 which is at goal.  Blood pressure is well  controlled today.    He denies any chest pain symptoms with activity.  Denies any dyspnea on exertion.  Denies any lower extremity edema orthopnea or PND symptoms.  No syncopal episodes.  Occasional palpitation for 1-2 beats 2-3 times per week.  Typically when he is taking his medications he notices this.  They are not particularly symptomatic per the patient.       NM STRESS TESTIN2019    1.The exercise nuclear stress test is abnormal.    2.The patient's exercise capacity is normal achieving 10.3 METS.    3.Adequate negative exercise ECG for ischemia after 8 minutes and 40 seconds on standard Aaron protocol.    4.There is a medium sized area of transmural infarction in the mid to basilar inferior and inferolateral segment(s) of the left ventricle. No ischemia seen.    5.The left ventricular ejection fraction is 50%.    6.There is no prior study available.    7.The findings of this examination were communicated to the nursing staff at [facility] and Dr. Chaz Cadena.    ECHO: 2019    Normal left ventricular size and systolic function.    Left ventricle ejection fraction is normal. The calculated left ventricular ejection fraction is 65%.    No previous study for comparison.    Normal right ventricular size and systolic function.    No hemodynamically significant valvular heart abnormalities.    Coronary Angiogram:   Dominance: Right   Right Coronary Artery   Prox RCA to Mid RCA lesion is 100% stenosed. Culprit lesion. The lesion is high risk, located proximal to the major branch and thrombotic.The lesion was not previously treated.     Prox RCA to Mid RCA lesion   Thrombectomy   Aspiration.   Supplies used: KIT CATHETER INDIGO RX 140CM WITH LG LUMEN ASP TUBING   PCI   Supplies used: CATH EMERGE 2.5MM X 12MM; STENT RESOLUTE MARCELLUS DE 2.7FR 4.62G10ZX RONYX [id number]; CATH EMERGE NC MR US 4.50MM X 15MM   There is a 0% residual stenosis post intervention.          Physical Examination Review of  Systems   Vitals:    07/07/20 0827   BP: 110/78   Pulse: 80   Resp: 16     Body mass index is 33.38 kg/m .  Wt Readings from Last 3 Encounters:   07/07/20 (!) 236 lb (107 kg)   11/01/19 (!) 227 lb 11.2 oz (103.3 kg)   10/28/19 (!) 227 lb 6.4 oz (103.1 kg)     No intake or output data in the 24 hours ending 07/29/19 1911  General Appearance:   no distress, normal body habitus   ENT/Mouth: membranes moist, no oral lesions or bleeding gums.      EYES:  no scleral icterus, normal conjunctivae   Neck: no carotid bruits or thyromegaly   Chest/Lungs:   lungs are clear to auscultation, no rales or wheezing, no sternal scar, equal chest wall expansion    Cardiovascular:   Regular. Normal first and second heart sounds with no murmurs, rubs, or gallops; the carotid, radial and posterior tibial pulses are intact, Jugular venous pressure noraml, no edema bilaterally.   Abdomen:  no o tenderness; bowel sounds are present   Extremities: no cyanosis or clubbing.  Right radial cath site is without hematoma.  Normal distal pulses.  Unchanged   Skin: no xanthelasma, warm.    Neurologic: normal  bilateral, no tremors     Psychiatric: alert and oriented x3, calm     General: WNL  Eyes: WNL  Ears/Nose/Throat: WNL  Lungs: WNL  Heart: WNL  Stomach: WNL  Bladder: WNL  Muscle/Joints: WNL  Skin: WNL  Nervous System: WNL  Mental Health: WNL     Blood: WNL       Medical History  Surgical History Family History Social History   Past Medical History:   Diagnosis Date   ? Acute ST elevation myocardial infarction (STEMI) involving other coronary artery of inferior wall (H)    ? CAD (coronary artery disease)    ? HLD (hyperlipidemia)     Past Surgical History:   Procedure Laterality Date   ? CORONARY ANGIOPLASTY WITH STENT PLACEMENT  07/10/2019    DAVID to pRCA for STEMI   ? CV CORONARY ANGIOGRAM N/A 7/10/2019    Procedure: Coronary Angiogram;  Surgeon: Itz Alicea MD;  Location: [facility] Cath Lab;  Service: Cardiology   ? CV LEFT  HEART CATHETERIZATION WITH LEFT VENTRICULOGRAM N/A 7/10/2019    Procedure: Left Heart Catheterization with Left Ventriculogram;  Surgeon: Itz Alicea MD;  Location: [facility] Cath Lab;  Service: Cardiology    Family History   Problem Relation Age of Onset   ? Diabetes Mother     Social History     Socioeconomic History   ? Marital status: Single     Spouse name: Not on file   ? Number of children: Not on file   ? Years of education: Not on file   ? Highest education level: Not on file   Occupational History   ? Not on file   Social Needs   ? Financial resource strain: Not on file   ? Food insecurity     Worry: Not on file     Inability: Not on file   ? Transportation needs     Medical: Not on file     Non-medical: Not on file   Tobacco Use   ? Smoking status: Former Smoker     Packs/day: 0.50     Years: 32.00     Pack years: 16.00     Types: Cigarettes     Start date: 1987     Last attempt to quit: 7/10/2019     Years since quittin.9   ? Smokeless tobacco: Former User     Quit date: 1999   Substance and Sexual Activity   ? Alcohol use: Yes     Frequency: 2-3 times a week     Drinks per session: 5 or 6     Binge frequency: Monthly   ? Drug use: Never   ? Sexual activity: Yes     Partners: Female   Lifestyle   ? Physical activity     Days per week: Not on file     Minutes per session: Not on file   ? Stress: Not on file   Relationships   ? Social connections     Talks on phone: Not on file     Gets together: Not on file     Attends Sabianist service: Not on file     Active member of club or organization: Not on file     Attends meetings of clubs or organizations: Not on file     Relationship status: Not on file   ? Intimate partner violence     Fear of current or ex partner: Not on file     Emotionally abused: Not on file     Physically abused: Not on file     Forced sexual activity: Not on file   Other Topics Concern   ? Not on file   Social History Narrative   ? Not on file           Medications  Allergies   Scheduled Meds:    Continuous Infusions:  PRN Meds:. No Known Allergies      Lab Results    Chemistry/lipid CBC Cardiac Enzymes/BNP/TSH/INR   Lab Results   Component Value Date    CHOL 127 10/18/2019    HDL 38 (L) 10/18/2019    LDLCALC 65 10/18/2019    TRIG 120 10/18/2019    CREATININE 1.05 07/29/2019    BUN 14 07/29/2019    K 4.4 07/29/2019     07/29/2019     07/29/2019    CO2 25 07/29/2019    Lab Results   Component Value Date    WBC 9.7 07/29/2019    HGB 13.8 (L) 07/29/2019    HCT 41.1 07/29/2019    MCV 90 07/29/2019     07/29/2019    Lab Results   Component Value Date    TROPONINI 0.04 07/29/2019     (H) 07/29/2019    INR 1.07 07/29/2019

## 2021-07-06 ENCOUNTER — COMMUNICATION - HEALTHEAST (OUTPATIENT)
Dept: CARDIOLOGY | Facility: CLINIC | Age: 51
End: 2021-07-06

## 2021-07-09 ENCOUNTER — COMMUNICATION - HEALTHEAST (OUTPATIENT)
Dept: CARDIOLOGY | Facility: CLINIC | Age: 51
End: 2021-07-09

## 2021-07-09 DIAGNOSIS — I49.3 PVC'S (PREMATURE VENTRICULAR CONTRACTIONS): ICD-10-CM

## 2021-07-09 DIAGNOSIS — E78.2 MIXED HYPERLIPIDEMIA: ICD-10-CM

## 2021-07-09 DIAGNOSIS — I25.10 CORONARY ARTERY DISEASE INVOLVING NATIVE CORONARY ARTERY OF NATIVE HEART WITHOUT ANGINA PECTORIS: ICD-10-CM

## 2021-07-09 DIAGNOSIS — I10 ESSENTIAL HYPERTENSION: ICD-10-CM

## 2021-07-09 RX ORDER — METOPROLOL SUCCINATE 25 MG/1
25 TABLET, EXTENDED RELEASE ORAL DAILY
Qty: 90 TABLET | Refills: 3 | Status: SHIPPED | OUTPATIENT
Start: 2021-07-09 | End: 2021-07-16

## 2021-07-09 RX ORDER — ATORVASTATIN CALCIUM 80 MG/1
80 TABLET, FILM COATED ORAL AT BEDTIME
Qty: 90 TABLET | Refills: 3 | Status: SHIPPED | OUTPATIENT
Start: 2021-07-09 | End: 2022-06-30

## 2021-07-09 NOTE — TELEPHONE ENCOUNTER
Telephone Encounter by Juan Nieto RN at 7/9/2021 10:03 AM     Author: Juan Nieto RN Service: -- Author Type: Registered Nurse    Filed: 7/9/2021 10:04 AM Encounter Date: 7/9/2021 Status: Signed    : Juan Nieto RN (Registered Nurse)       Copied from [employer].  ===View-only below this line===  ----- Message -----  From: Chaz Cadena DO  Sent: 7/9/2021   9:28 AM CDT  To: Juan Nieto RN  Subject: FW: Do I need an appointment?                    Please refill patients cardiac medications for 1 year.  Follow up with me in July 2022.    Thanks,    Jona  Prescriptions sent, and my chart message sent to patient. IRINEO Knowles

## 2021-07-14 PROBLEM — I95.9 HYPOTENSION: Status: RESOLVED | Noted: 2019-07-29 | Resolved: 2020-07-07

## 2021-07-14 PROBLEM — I21.11 ST ELEVATION MYOCARDIAL INFARCTION INVOLVING RIGHT CORONARY ARTERY (H): Status: RESOLVED | Noted: 2019-07-10 | Resolved: 2020-08-14

## 2021-07-14 PROBLEM — I21.21: Status: RESOLVED | Noted: 2019-07-10 | Resolved: 2019-09-20

## 2021-07-16 DIAGNOSIS — I10 ESSENTIAL HYPERTENSION: ICD-10-CM

## 2021-07-16 DIAGNOSIS — I49.3 PVC'S (PREMATURE VENTRICULAR CONTRACTIONS): ICD-10-CM

## 2021-07-16 DIAGNOSIS — I25.10 CORONARY ARTERY DISEASE INVOLVING NATIVE CORONARY ARTERY OF NATIVE HEART WITHOUT ANGINA PECTORIS: ICD-10-CM

## 2021-07-16 RX ORDER — METOPROLOL SUCCINATE 25 MG/1
25 TABLET, EXTENDED RELEASE ORAL DAILY
Qty: 90 TABLET | Refills: 1 | Status: SHIPPED | OUTPATIENT
Start: 2021-07-16 | End: 2022-08-04

## 2021-10-17 ENCOUNTER — HEALTH MAINTENANCE LETTER (OUTPATIENT)
Age: 51
End: 2021-10-17

## 2022-06-30 DIAGNOSIS — I25.10 CORONARY ARTERY DISEASE INVOLVING NATIVE CORONARY ARTERY OF NATIVE HEART WITHOUT ANGINA PECTORIS: ICD-10-CM

## 2022-06-30 DIAGNOSIS — E78.2 MIXED HYPERLIPIDEMIA: ICD-10-CM

## 2022-06-30 RX ORDER — ATORVASTATIN CALCIUM 80 MG/1
80 TABLET, FILM COATED ORAL AT BEDTIME
Qty: 90 TABLET | Refills: 0 | Status: SHIPPED | OUTPATIENT
Start: 2022-06-30 | End: 2022-08-04

## 2022-08-04 ENCOUNTER — OFFICE VISIT (OUTPATIENT)
Dept: CARDIOLOGY | Facility: CLINIC | Age: 52
End: 2022-08-04
Payer: COMMERCIAL

## 2022-08-04 VITALS
WEIGHT: 235 LBS | HEART RATE: 72 BPM | DIASTOLIC BLOOD PRESSURE: 72 MMHG | HEIGHT: 70 IN | BODY MASS INDEX: 33.64 KG/M2 | SYSTOLIC BLOOD PRESSURE: 112 MMHG

## 2022-08-04 DIAGNOSIS — I10 ESSENTIAL HYPERTENSION: ICD-10-CM

## 2022-08-04 DIAGNOSIS — I25.10 CORONARY ARTERY DISEASE INVOLVING NATIVE CORONARY ARTERY OF NATIVE HEART WITHOUT ANGINA PECTORIS: Primary | ICD-10-CM

## 2022-08-04 DIAGNOSIS — I49.3 PVC'S (PREMATURE VENTRICULAR CONTRACTIONS): ICD-10-CM

## 2022-08-04 DIAGNOSIS — E78.2 MIXED HYPERLIPIDEMIA: ICD-10-CM

## 2022-08-04 DIAGNOSIS — I21.11 ST ELEVATION MYOCARDIAL INFARCTION INVOLVING RIGHT CORONARY ARTERY (H): ICD-10-CM

## 2022-08-04 PROCEDURE — 99214 OFFICE O/P EST MOD 30 MIN: CPT | Performed by: INTERNAL MEDICINE

## 2022-08-04 RX ORDER — METOPROLOL SUCCINATE 25 MG/1
25 TABLET, EXTENDED RELEASE ORAL DAILY
Qty: 90 TABLET | Refills: 3 | Status: SHIPPED | OUTPATIENT
Start: 2022-08-04 | End: 2023-10-10

## 2022-08-04 RX ORDER — ATORVASTATIN CALCIUM 80 MG/1
80 TABLET, FILM COATED ORAL AT BEDTIME
Qty: 90 TABLET | Refills: 3 | Status: SHIPPED | OUTPATIENT
Start: 2022-08-04 | End: 2023-10-10

## 2022-08-04 NOTE — LETTER
Date:August 4, 2022      Patient was self referred, no letter generated. Do not send.        [facility] Health Information

## 2022-08-04 NOTE — LETTER
8/4/2022    Physician No Ref-Primary  No address on file    RE: Jamie Charles       Dear Colleague,     I had the pleasure of seeing Jamie Charles in the [facility].    HEART CARE ENCOUNTER CONSULTATON NATHANIEL BROWN [facility]  [phone]      Assessment/Recommendations   Assessment:    1. Coronary artery disease with prior proximal RCA disease s/p PCI.      2. Inferolateral ST segment elevation myocardial infarction (July 2019) s/p proximal RCA stenosis.  Complicated by ntermittent AV block prior to PCI.    3. Frequent PVCs and NSVT.  Symptoms resolved.    4. Hyperlipidemia (LDL: 71- at goal of near 70)   LDL Cholesterol Calculated   Date Value Ref Range Status   08/14/2020 71 <=129 mg/dL Final   5. Smoker, remission    Plan:  1. Metoprolol 25 mg XL daily for PVCs and CAD.   2. ASA 81 mg daily (life long)  3. Atorvastatin 80 mg daily for HLD. LDL at goal and <70.    4. Continue exercise regiment (treadmill) at 4-5 time per week.  500 miles in past year.            History of Present Illness/Subjective    HPI: Jamie Charles is a 52 year old male premature coronary artery disease, proximal RCA stent, history of ST elevated myocardial infarction, hypertension who presents cardiology clinic in follow-up.    With a past 2 years the patient is done excellent.  He continues to exercise daily.  He is over 500 miles on his treadmill last year.  His weight is stable.  Blood pressure is very well controlled today at 112/72.  LDL is 71.  Remains on aspirin and metoprolol.  He had PVCs which were symptomatic in the past which have resolved with use of metoprolol XL.  No longer smokes since 2019.    Compliant with all medications.  No side effects to statin therapy.    Stress test was in 2019 was negative for ischemia.  Reviewed EKG component which is negative for ischemic ECG changes.     Echocardiogram Results: 7/11/2019  Left ventricle ejection fraction is normal. The calculated left  Appointment is scheduled for 4/19/2022; father was advised refills will be sent through 4/19/2022 until patient is seen.    Thank you   "ventricular ejection fraction is 65%.    No previous study for comparison.    Normal right ventricular size and systolic function.    No hemodynamically significant valvular heart abnormalities.       Physical Examination  Review of Systems   Vitals: /72 (BP Location: Left arm, Patient Position: Sitting, Cuff Size: Adult Large)   Pulse 72   Ht 1.778 m (5' 10\")   Wt 106.6 kg (235 lb)   BMI 33.72 kg/m    BMI= Body mass index is 33.72 kg/m .  Wt Readings from Last 3 Encounters:   08/04/22 106.6 kg (235 lb)   08/14/20 107 kg (236 lb)   07/07/20 107 kg (236 lb)           ENT/Mouth: membranes moist, no oral lesions or bleeding gums.      EYES:  no scleral icterus, normal conjunctivae       Chest/Lungs:   lungs are clear to auscultation, no rales or wheezing, no sternal scar, equal chest wall expansion    Cardiovascular:   Regular. Normal first and second heart sounds with no murmurs, rubs, or gallops; the carotid, radial and posterior tibial pulses are intact, Jugular venous pressure normal, no edema bilaterally    Abdomen:  no tenderness; bowel sounds are present   Extremities: no cyanosis or clubbing   Skin: no xanthelasma, warm.    Neurologic: normal  bilateral, no tremors     Psychiatric: alert and oriented x3, calm        Please refer above for cardiac ROS details.        Medical History  Surgical History Family History Social History   Past Medical History:   Diagnosis Date     Acute ST elevation myocardial infarction (STEMI) involving other coronary artery of inferior wall (H)      CAD (coronary artery disease)      HLD (hyperlipidemia)      Past Surgical History:   Procedure Laterality Date     ANGIOPLASTY  07/10/2019    DAVID to pRCA for STEMI     CV CORONARY ANGIOGRAM N/A 7/10/2019    Procedure: Coronary Angiogram;  Surgeon: Itz Alicea MD;  Location: [facility] Cath Lab;  Service: Cardiology     CV LEFT HEART CATHETERIZATION WITH LEFT VENTRICULOGRAM N/A 7/10/2019    Procedure: Left Heart " Catheterization with Left Ventriculogram;  Surgeon: Itz Alicea MD;  Location: [facility] Cath Lab;  Service: Cardiology     Family History   Problem Relation Age of Onset     Diabetes Mother      Diverticulitis Father      No Known Problems Sister      No Known Problems Brother      No Known Problems Brother         Social History     Socioeconomic History     Marital status: Single     Spouse name: Not on file     Number of children: Not on file     Years of education: Not on file     Highest education level: Not on file   Occupational History     Not on file   Tobacco Use     Smoking status: Former Smoker     Packs/day: 0.50     Years: 32.00     Pack years: 16.00     Types: Cigarettes, Cigarettes     Start date: 7/11/1987     Quit date: 7/10/2019     Years since quitting: 3.0     Smokeless tobacco: Former User     Quit date: 7/11/1999   Substance and Sexual Activity     Alcohol use: Yes     Alcohol/week: 12.0 standard drinks     Drug use: Never     Sexual activity: Yes     Partners: Female   Other Topics Concern     Not on file   Social History Narrative    Lives with his girlfriend/partner, Deepthi.  Works in [employer], industrial laundry.       Social Determinants of Health     Financial Resource Strain: Not on file   Food Insecurity: Not on file   Transportation Needs: Not on file   Physical Activity: Not on file   Stress: Not on file   Social Connections: Not on file   Intimate Partner Violence: Not on file   Housing Stability: Not on file           Medications  Allergies   Current Outpatient Medications   Medication Sig Dispense Refill     aspirin 81 mg chewable tablet [ASPIRIN 81 MG CHEWABLE TABLET] Chew 1 tablet (81 mg total) daily.  0     atorvastatin (LIPITOR) 80 MG tablet Take 1 tablet (80 mg) by mouth At Bedtime 90 tablet 0     clobetasoL (TEMOVATE) 0.05 % ointment [CLOBETASOL (TEMOVATE) 0.05 % OINTMENT] Apply topically 2 (two) times a day. 60 g 1     diphenhydrAMINE (BENADRYL) 25 mg  capsule [DIPHENHYDRAMINE (BENADRYL) 25 MG CAPSULE] Take 25-50 mg by mouth every 6 (six) hours as needed for itching.       loratadine (CLARITIN) 10 mg tablet [LORATADINE (CLARITIN) 10 MG TABLET] Take 10 mg by mouth daily as needed for allergies.       magnesium gluconate (MAGONATE) 27 mg magnesium (500 mg) Tab tablet [MAGNESIUM GLUCONATE (MAGONATE) 27 MG MAGNESIUM (500 MG) TAB TABLET] Take 2 tablets (54 mg total) by mouth at bedtime.  0     metoprolol succinate ER (TOPROL-XL) 25 MG 24 hr tablet Take 1 tablet (25 mg) by mouth daily 90 tablet 1     No Known Allergies       Lab Results    Chemistry/lipid CBC Cardiac Enzymes/BNP/TSH/INR   Recent Labs   Lab Test 08/14/20  0900   CHOL 136   HDL 34*   LDL 71   TRIG 153*     Recent Labs   Lab Test 08/14/20  0900 10/18/19  0936 07/12/19  0005   LDL 71 65 150*     Recent Labs   Lab Test 08/14/20  0900      POTASSIUM 4.6   CHLORIDE 105   CO2 26      BUN 10   CR 1.00   GFRESTIMATED >60   VICENTA 9.4     Recent Labs   Lab Test 08/14/20  0900 07/29/19  0914 07/12/19  0005   CR 1.00 1.05 0.81     Recent Labs   Lab Test 08/14/20  0900 07/11/19  0833   A1C 6.0* 6.0          Recent Labs   Lab Test 08/14/20  0900   WBC 7.8   HGB 14.6   HCT 43.3   MCV 90        Recent Labs   Lab Test 08/14/20  0900 07/29/19  0914 07/10/19  2136   HGB 14.6 13.8* 16.3    Recent Labs   Lab Test 07/29/19  2356 07/29/19  1809 07/29/19  1113   TROPONINI 0.04 0.04 0.04     Recent Labs   Lab Test 07/29/19  0914   *     No results for input(s): TSH in the last 68761 hours.  Recent Labs   Lab Test 07/29/19  0914 07/10/19  2136   INR 1.07 0.98        Chaz Cadena DO      Today's clinic visit entailed:  Review of prior external note(s) from - Dr. Albright  Review of the result(s) of each unique test - Labs  The following tests were independently interpreted by me as noted in my documentation: ECG, ECG stress  Prescription drug management  The level of medical decision making during this  visit was of moderate complexity.                                    Thank you for allowing me to participate in the care of your patient.      Sincerely,     Chaz Cadena DO     [facility]  cc:   Referred Self, MD  No address on file

## 2022-08-04 NOTE — PROGRESS NOTES
HEART CARE ENCOUNTER CONSULTATON NOTE      [facility]  [phone]      Assessment/Recommendations   Assessment:    1. Coronary artery disease with prior proximal RCA disease s/p PCI.      2. Inferolateral ST segment elevation myocardial infarction (July 2019) s/p proximal RCA stenosis.  Complicated by ntermittent AV block prior to PCI.    3. Frequent PVCs and NSVT.  Symptoms resolved.    4. Hyperlipidemia (LDL: 71- at goal of near 70)   LDL Cholesterol Calculated   Date Value Ref Range Status   08/14/2020 71 <=129 mg/dL Final   5. Smoker, remission    Plan:  1. Metoprolol 25 mg XL daily for PVCs and CAD.   2. ASA 81 mg daily (life long)  3. Atorvastatin 80 mg daily for HLD. LDL at goal and <70.    4. Continue exercise regiment (treadmill) at 4-5 time per week.  500 miles in past year.            History of Present Illness/Subjective    HPI: Jamie Charles is a 52 year old male premature coronary artery disease, proximal RCA stent, history of ST elevated myocardial infarction, hypertension who presents cardiology clinic in follow-up.    With a past 2 years the patient is done excellent.  He continues to exercise daily.  He is over 500 miles on his treadmill last year.  His weight is stable.  Blood pressure is very well controlled today at 112/72.  LDL is 71.  Remains on aspirin and metoprolol.  He had PVCs which were symptomatic in the past which have resolved with use of metoprolol XL.  No longer smokes since 2019.    Compliant with all medications.  No side effects to statin therapy.    Stress test was in 2019 was negative for ischemia.  Reviewed EKG component which is negative for ischemic ECG changes.     Echocardiogram Results: 7/11/2019  Left ventricle ejection fraction is normal. The calculated left ventricular ejection fraction is 65%.    No previous study for comparison.    Normal right ventricular size and systolic function.    No hemodynamically significant valvular heart  "abnormalities.       Physical Examination  Review of Systems   Vitals: /72 (BP Location: Left arm, Patient Position: Sitting, Cuff Size: Adult Large)   Pulse 72   Ht 1.778 m (5' 10\")   Wt 106.6 kg (235 lb)   BMI 33.72 kg/m    BMI= Body mass index is 33.72 kg/m .  Wt Readings from Last 3 Encounters:   08/04/22 106.6 kg (235 lb)   08/14/20 107 kg (236 lb)   07/07/20 107 kg (236 lb)           ENT/Mouth: membranes moist, no oral lesions or bleeding gums.      EYES:  no scleral icterus, normal conjunctivae       Chest/Lungs:   lungs are clear to auscultation, no rales or wheezing, no sternal scar, equal chest wall expansion    Cardiovascular:   Regular. Normal first and second heart sounds with no murmurs, rubs, or gallops; the carotid, radial and posterior tibial pulses are intact, Jugular venous pressure normal, no edema bilaterally    Abdomen:  no tenderness; bowel sounds are present   Extremities: no cyanosis or clubbing   Skin: no xanthelasma, warm.    Neurologic: normal  bilateral, no tremors     Psychiatric: alert and oriented x3, calm        Please refer above for cardiac ROS details.        Medical History  Surgical History Family History Social History   Past Medical History:   Diagnosis Date     Acute ST elevation myocardial infarction (STEMI) involving other coronary artery of inferior wall (H)      CAD (coronary artery disease)      HLD (hyperlipidemia)      Past Surgical History:   Procedure Laterality Date     ANGIOPLASTY  07/10/2019    DAVID to pRCA for STEMI     CV CORONARY ANGIOGRAM N/A 7/10/2019    Procedure: Coronary Angiogram;  Surgeon: Itz Alicea MD;  Location: [facility];  Service: Cardiology     CV LEFT HEART CATHETERIZATION WITH LEFT VENTRICULOGRAM N/A 7/10/2019    Procedure: Left Heart Catheterization with Left Ventriculogram;  Surgeon: Itz Alicea MD;  Location: [facility];  Service: Cardiology     Family History   Problem Relation Age of Onset "     Diabetes Mother      Diverticulitis Father      No Known Problems Sister      No Known Problems Brother      No Known Problems Brother         Social History     Socioeconomic History     Marital status: Single     Spouse name: Not on file     Number of children: Not on file     Years of education: Not on file     Highest education level: Not on file   Occupational History     Not on file   Tobacco Use     Smoking status: Former Smoker     Packs/day: 0.50     Years: 32.00     Pack years: 16.00     Types: Cigarettes, Cigarettes     Start date: 7/11/1987     Quit date: 7/10/2019     Years since quitting: 3.0     Smokeless tobacco: Former User     Quit date: 7/11/1999   Substance and Sexual Activity     Alcohol use: Yes     Alcohol/week: 12.0 standard drinks     Drug use: Never     Sexual activity: Yes     Partners: Female   Other Topics Concern     Not on file   Social History Narrative    Lives with his girlfriend/partner, Deepthi.  Works in [employer], industrial laundry.       Social Determinants of Health     Financial Resource Strain: Not on file   Food Insecurity: Not on file   Transportation Needs: Not on file   Physical Activity: Not on file   Stress: Not on file   Social Connections: Not on file   Intimate Partner Violence: Not on file   Housing Stability: Not on file           Medications  Allergies   Current Outpatient Medications   Medication Sig Dispense Refill     aspirin 81 mg chewable tablet [ASPIRIN 81 MG CHEWABLE TABLET] Chew 1 tablet (81 mg total) daily.  0     atorvastatin (LIPITOR) 80 MG tablet Take 1 tablet (80 mg) by mouth At Bedtime 90 tablet 0     clobetasoL (TEMOVATE) 0.05 % ointment [CLOBETASOL (TEMOVATE) 0.05 % OINTMENT] Apply topically 2 (two) times a day. 60 g 1     diphenhydrAMINE (BENADRYL) 25 mg capsule [DIPHENHYDRAMINE (BENADRYL) 25 MG CAPSULE] Take 25-50 mg by mouth every 6 (six) hours as needed for itching.       loratadine (CLARITIN) 10 mg tablet [LORATADINE (CLARITIN) 10  MG TABLET] Take 10 mg by mouth daily as needed for allergies.       magnesium gluconate (MAGONATE) 27 mg magnesium (500 mg) Tab tablet [MAGNESIUM GLUCONATE (MAGONATE) 27 MG MAGNESIUM (500 MG) TAB TABLET] Take 2 tablets (54 mg total) by mouth at bedtime.  0     metoprolol succinate ER (TOPROL-XL) 25 MG 24 hr tablet Take 1 tablet (25 mg) by mouth daily 90 tablet 1     No Known Allergies       Lab Results    Chemistry/lipid CBC Cardiac Enzymes/BNP/TSH/INR   Recent Labs   Lab Test 08/14/20  0900   CHOL 136   HDL 34*   LDL 71   TRIG 153*     Recent Labs   Lab Test 08/14/20  0900 10/18/19  0936 07/12/19  0005   LDL 71 65 150*     Recent Labs   Lab Test 08/14/20  0900      POTASSIUM 4.6   CHLORIDE 105   CO2 26      BUN 10   CR 1.00   GFRESTIMATED >60   VICENTA 9.4     Recent Labs   Lab Test 08/14/20  0900 07/29/19  0914 07/12/19  0005   CR 1.00 1.05 0.81     Recent Labs   Lab Test 08/14/20  0900 07/11/19  0833   A1C 6.0* 6.0          Recent Labs   Lab Test 08/14/20  0900   WBC 7.8   HGB 14.6   HCT 43.3   MCV 90        Recent Labs   Lab Test 08/14/20  0900 07/29/19  0914 07/10/19  2136   HGB 14.6 13.8* 16.3    Recent Labs   Lab Test 07/29/19  2356 07/29/19  1809 07/29/19  1113   TROPONINI 0.04 0.04 0.04     Recent Labs   Lab Test 07/29/19  0914   *     No results for input(s): TSH in the last 75703 hours.  Recent Labs   Lab Test 07/29/19  0914 07/10/19  2136   INR 1.07 0.98        Chaz Cadena,       Today's clinic visit entailed:  Review of prior external note(s) from - Dr. Albright  Review of the result(s) of each unique test - Labs  The following tests were independently interpreted by me as noted in my documentation: ECG, ECG stress  Prescription drug management  The level of medical decision making during this visit was of moderate complexity.

## 2022-10-01 ENCOUNTER — HEALTH MAINTENANCE LETTER (OUTPATIENT)
Age: 52
End: 2022-10-01

## 2022-11-09 ENCOUNTER — TRANSFERRED RECORDS (OUTPATIENT)
Dept: HEALTH INFORMATION MANAGEMENT | Facility: CLINIC | Age: 52
End: 2022-11-09

## 2022-11-10 ENCOUNTER — MYC MEDICAL ADVICE (OUTPATIENT)
Dept: CARDIOLOGY | Facility: CLINIC | Age: 52
End: 2022-11-10

## 2022-11-10 DIAGNOSIS — I49.3 PVC'S (PREMATURE VENTRICULAR CONTRACTIONS): ICD-10-CM

## 2022-11-10 DIAGNOSIS — I25.10 CORONARY ARTERY DISEASE INVOLVING NATIVE CORONARY ARTERY OF NATIVE HEART WITHOUT ANGINA PECTORIS: ICD-10-CM

## 2022-11-10 DIAGNOSIS — R55 NEAR SYNCOPE: Primary | ICD-10-CM

## 2022-11-10 DIAGNOSIS — I21.11 ST ELEVATION MYOCARDIAL INFARCTION INVOLVING RIGHT CORONARY ARTERY (H): ICD-10-CM

## 2022-11-16 NOTE — TELEPHONE ENCOUNTER
===View-only below this line===  ----- Message -----  From: Chaz Cadena DO  Sent: 11/10/2022  10:24 AM CST  To: Juan Nieto RN  Subject: FW: Visited ER 11/09/22                          Please inform the patient that I reviewed information from [facility].  They felt this could be related to a vagal issue.  But given his history of coronary disease (proximal RCA) would recommend further testing with echo stress test given chronic baseline EKG abnormalities (indication near syncope, coronary disease involving the proximal RCA) and 24 hour Holter monitor: Indication: near syncope.  Recommend next available appointment myself as well

## 2022-11-30 ENCOUNTER — HOSPITAL ENCOUNTER (OUTPATIENT)
Dept: CARDIOLOGY | Facility: CLINIC | Age: 52
Discharge: HOME | End: 2022-11-30
Attending: INTERNAL MEDICINE
Payer: COMMERCIAL

## 2022-11-30 DIAGNOSIS — R55 NEAR SYNCOPE: ICD-10-CM

## 2022-11-30 DIAGNOSIS — I25.10 CORONARY ARTERY DISEASE INVOLVING NATIVE CORONARY ARTERY OF NATIVE HEART WITHOUT ANGINA PECTORIS: ICD-10-CM

## 2022-11-30 DIAGNOSIS — I49.3 PVC'S (PREMATURE VENTRICULAR CONTRACTIONS): ICD-10-CM

## 2022-11-30 DIAGNOSIS — I21.11 ST ELEVATION MYOCARDIAL INFARCTION INVOLVING RIGHT CORONARY ARTERY (H): ICD-10-CM

## 2022-11-30 PROCEDURE — 93350 STRESS TTE ONLY: CPT | Mod: 26 | Performed by: INTERNAL MEDICINE

## 2022-11-30 PROCEDURE — 93321 DOPPLER ECHO F-UP/LMTD STD: CPT | Mod: 26 | Performed by: INTERNAL MEDICINE

## 2022-11-30 PROCEDURE — 93226 XTRNL ECG REC<48 HR SCAN A/R: CPT

## 2022-11-30 PROCEDURE — 93018 CV STRESS TEST I&R ONLY: CPT | Performed by: INTERNAL MEDICINE

## 2022-11-30 PROCEDURE — 255N000002 HC RX 255 OP 636: Performed by: INTERNAL MEDICINE

## 2022-11-30 PROCEDURE — 999N000208 ECHO STRESS ECHOCARDIOGRAM

## 2022-11-30 PROCEDURE — 93321 DOPPLER ECHO F-UP/LMTD STD: CPT | Mod: TC

## 2022-11-30 PROCEDURE — 93352 ADMIN ECG CONTRAST AGENT: CPT | Performed by: INTERNAL MEDICINE

## 2022-11-30 PROCEDURE — 93325 DOPPLER ECHO COLOR FLOW MAPG: CPT | Mod: 26 | Performed by: INTERNAL MEDICINE

## 2022-11-30 PROCEDURE — 93016 CV STRESS TEST SUPVJ ONLY: CPT | Performed by: INTERNAL MEDICINE

## 2022-11-30 RX ADMIN — PERFLUTREN 4 ML: 6.52 INJECTION, SUSPENSION INTRAVENOUS at 08:30

## 2022-12-02 PROCEDURE — 93227 XTRNL ECG REC<48 HR R&I: CPT | Performed by: INTERNAL MEDICINE

## 2023-01-12 ENCOUNTER — OFFICE VISIT (OUTPATIENT)
Dept: CARDIOLOGY | Facility: CLINIC | Age: 53
End: 2023-01-12
Attending: INTERNAL MEDICINE
Payer: COMMERCIAL

## 2023-01-12 VITALS
HEART RATE: 88 BPM | BODY MASS INDEX: 33.93 KG/M2 | WEIGHT: 237 LBS | DIASTOLIC BLOOD PRESSURE: 76 MMHG | RESPIRATION RATE: 16 BRPM | SYSTOLIC BLOOD PRESSURE: 110 MMHG | HEIGHT: 70 IN

## 2023-01-12 DIAGNOSIS — I49.3 PVC'S (PREMATURE VENTRICULAR CONTRACTIONS): ICD-10-CM

## 2023-01-12 DIAGNOSIS — R55 NEAR SYNCOPE: ICD-10-CM

## 2023-01-12 DIAGNOSIS — I21.11 ST ELEVATION MYOCARDIAL INFARCTION INVOLVING RIGHT CORONARY ARTERY (H): ICD-10-CM

## 2023-01-12 DIAGNOSIS — I25.10 CORONARY ARTERY DISEASE INVOLVING NATIVE CORONARY ARTERY OF NATIVE HEART WITHOUT ANGINA PECTORIS: Primary | ICD-10-CM

## 2023-01-12 PROCEDURE — 99214 OFFICE O/P EST MOD 30 MIN: CPT | Performed by: INTERNAL MEDICINE

## 2023-01-12 NOTE — PROGRESS NOTES
HEART CARE ENCOUNTER CONSULTATON [facility]  [phone]      Assessment/Recommendations   Assessment:    1. Premature Coronary artery disease with prior proximal RCA disease s/p PCI.      2. Inferolateral ST segment elevation myocardial infarction (July 2019) s/p proximal RCA stenosis.  Complicated by ntermittent AV block prior to PCI.    3. Intermittent PVCs.   4. Hyperlipidemia (LDL: 71- at goal of near 70)   LDL Cholesterol Calculated   Date Value Ref Range Status   08/14/2020 71 <=129 mg/dL Final   5. Smoker, remission  6. Near syncope,     Plan:  1. Metoprolol 25 mg XL daily for PVCs and CAD.   2. ASA 81 mg daily (life long)  3. Atorvastatin 80 mg daily for HLD. LDL at goal and <70.    4. Continue exercise regiment (treadmill)   5. Check lipids with PCP visit (up coming).     Follow-up in 12 months.          History of Present Illness/Subjective    HPI: Jamie Charles is a 52 year old male premature coronary artery disease, proximal RCA stent, history of ST elevated myocardial infarction, hypertension who presents cardiology clinic in follow-up.    Patient presents today in follow-up after emergency room visit in follow-up 2022.  He states that he was noticing lower back pain on his left side and was feeling palpitations throughout a 1 to 2-day period.  While at work he had noticed that the palpitations were worse decided to go to the bathroom while using the bathroom he felt more lightheaded stood up and walked across to his office.  Walking across his office he felt lightheaded like he could pass out but did not lose consciousness.  Given his concerns he called EMS.  On EMS arrival there was no acute EKG changes.  He was transferred to the emergency department.  At the emergency department it was noted that he had no evidence of acute myocardial infarction.  He was discharged with diagnosis of near syncope.    Has had no recurrence of these symptoms since.  He underwent a  "treadmill ECG echo stress test which was negative for ischemia.  He has baseline mild hypokinesis of the inferior lateral wall which improved with exercise.  No EKG changes concerning for ischemia.  He underwent cardiac monitoring which demonstrated no sustained atrial ventricular arrhythmias.  No evidence of bradycardia.    Since early December he is had no palpitations.  Has had no recurrence of symptoms.  He feels well today.  Continues to do intermittent treadmill exercises with no cardiac symptoms.  He continues to do work around his house including shoveling with no difficulty.  He is compliant with all of his medications.    Echocardiogram Results: Echo stress images were reviewed.  Agree there is mild hypokinesis of the basal to mid inferolateral wall.  The wall motion of the inferior lateral wall improve with exercise.  Interpretation Summary  1. Abnormal stress echocardiogram by virtue of baseline imaging suggesting  basal and mid posterior hypokinesis. The specificity of the finding, however,  is reduced due to suboptimal acoustic imaging. This appears to persist  consistent with prior infarction. No obvious ischemia is identified, however.  2. Normal resting LV systolic performance with an ejection fraction of 55-60%.  There is normal improvement in left ventricular systolic performance with a  peak ejection fraction of 70-75%.  3. No ECG evidence of ischemia.  4. No anginal chest pain reported with exercise.  5. Average functional capacity for age.  6. Two(2) PVCs were noted. No other rhythm disturbances identified.       Physical Examination  Review of Systems   Vitals: /76 (BP Location: Left arm, Patient Position: Sitting, Cuff Size: Adult Large)   Pulse 88   Resp 16   Ht 1.778 m (5' 10\")   Wt 107.5 kg (237 lb)   BMI 34.01 kg/m    BMI= Body mass index is 34.01 kg/m .  Wt Readings from Last 3 Encounters:   01/12/23 107.5 kg (237 lb)   08/04/22 106.6 kg (235 lb)   08/14/20 107 kg (236 lb) "        Pleasant, mildly obese   ENT/Mouth: membranes moist, no oral lesions or bleeding gums.      EYES:  no scleral icterus, normal conjunctivae       Chest/Lungs:   lungs are clear to auscultation, no rales or wheezing, no sternal scar, equal chest wall expansion    Cardiovascular:   Regular. Normal first and second heart sounds with no murmurs, rubs, or gallops; the carotid, radial and posterior tibial pulses are intact, Jugular venous pressure normal, no edema bilaterally.    Abdomen:  no tenderness; bowel sounds are present   Extremities: no cyanosis or clubbing   Skin: no xanthelasma, warm.    Neurologic: normal  bilateral, no tremors     Psychiatric: alert and oriented x3, calm        Please refer above for cardiac ROS details.        Medical History  Surgical History Family History Social History   Past Medical History:   Diagnosis Date     Acute ST elevation myocardial infarction (STEMI) involving other coronary artery of inferior wall (H)      CAD (coronary artery disease)      HLD (hyperlipidemia)      Past Surgical History:   Procedure Laterality Date     ANGIOPLASTY  07/10/2019    DAVID to pRCA for STEMI     CV CORONARY ANGIOGRAM N/A 7/10/2019    Procedure: Coronary Angiogram;  Surgeon: Itz Alicea MD;  Location: [facility];  Service: Cardiology     CV LEFT HEART CATHETERIZATION WITH LEFT VENTRICULOGRAM N/A 7/10/2019    Procedure: Left Heart Catheterization with Left Ventriculogram;  Surgeon: Itz Alicea MD;  Location: [facility];  Service: Cardiology     Family History   Problem Relation Age of Onset     Diabetes Mother      Diverticulitis Father      No Known Problems Sister      No Known Problems Brother      No Known Problems Brother         Social History     Socioeconomic History     Marital status: Single     Spouse name: Not on file     Number of children: Not on file     Years of education: Not on file     Highest education level: Not on file    Occupational History     Not on file   Tobacco Use     Smoking status: Former     Packs/day: 0.50     Years: 32.00     Pack years: 16.00     Types: Cigarettes     Start date: 7/11/1987     Quit date: 7/10/2019     Years since quitting: 3.5     Smokeless tobacco: Former     Quit date: 7/11/1999   Substance and Sexual Activity     Alcohol use: Yes     Alcohol/week: 12.0 standard drinks     Drug use: Never     Sexual activity: Yes     Partners: Female   Other Topics Concern     Not on file   Social History Narrative    Lives with his girlfriend/partner, Deepthi.  Works in [employer], industrial laundry.       Social Determinants of Health     Financial Resource Strain: Not on file   Food Insecurity: Not on file   Transportation Needs: Not on file   Physical Activity: Not on file   Stress: Not on file   Social Connections: Not on file   Intimate Partner Violence: Not on file   Housing Stability: Not on file           Medications  Allergies   Current Outpatient Medications   Medication Sig Dispense Refill     aspirin 81 mg chewable tablet [ASPIRIN 81 MG CHEWABLE TABLET] Chew 1 tablet (81 mg total) daily.  0     atorvastatin (LIPITOR) 80 MG tablet Take 1 tablet (80 mg) by mouth At Bedtime 90 tablet 3     clobetasoL (TEMOVATE) 0.05 % ointment [CLOBETASOL (TEMOVATE) 0.05 % OINTMENT] Apply topically 2 (two) times a day. 60 g 1     loratadine (CLARITIN) 10 mg tablet [LORATADINE (CLARITIN) 10 MG TABLET] Take 10 mg by mouth daily as needed for allergies.       magnesium gluconate (MAGONATE) 27 mg magnesium (500 mg) Tab tablet [MAGNESIUM GLUCONATE (MAGONATE) 27 MG MAGNESIUM (500 MG) TAB TABLET] Take 2 tablets (54 mg total) by mouth at bedtime.  0     metoprolol succinate ER (TOPROL XL) 25 MG 24 hr tablet Take 1 tablet (25 mg) by mouth daily 90 tablet 3     No Known Allergies       Lab Results    Chemistry/lipid CBC Cardiac Enzymes/BNP/TSH/INR   Recent Labs   Lab Test 08/14/20  0900   CHOL 136   HDL 34*   LDL 71   TRIG 153*      Recent Labs   Lab Test 08/14/20  0900 10/18/19  0936 07/12/19  0005   LDL 71 65 150*     Recent Labs   Lab Test 08/14/20  0900      POTASSIUM 4.6   CHLORIDE 105   CO2 26      BUN 10   CR 1.00   GFRESTIMATED >60   VICENTA 9.4     Recent Labs   Lab Test 08/14/20  0900 07/29/19  0914 07/12/19  0005   CR 1.00 1.05 0.81     Recent Labs   Lab Test 08/14/20  0900 07/11/19  0833   A1C 6.0* 6.0          Recent Labs   Lab Test 08/14/20  0900   WBC 7.8   HGB 14.6   HCT 43.3   MCV 90        Recent Labs   Lab Test 08/14/20  0900 07/29/19  0914 07/10/19  2136   HGB 14.6 13.8* 16.3    Recent Labs   Lab Test 07/29/19  2356 07/29/19  1809 07/29/19  1113   TROPONINI 0.04 0.04 0.04     Recent Labs   Lab Test 07/29/19  0914   *     No results for input(s): TSH in the last 48240 hours.  Recent Labs   Lab Test 07/29/19  0914 07/10/19  2136   INR 1.07 0.98        Chaz Cadena DO

## 2023-01-12 NOTE — LETTER
1/12/2023    Ji Albright MD  [address] [facility] [address]    RE: Jamie Charles       Dear Colleague,     I had the pleasure of seeing Jamie Charles in the [facility].    HEART CARE ENCOUNTER CONSULTATON NATHANIEL BROWN [facility]  [phone]      Assessment/Recommendations   Assessment:    1. Premature Coronary artery disease with prior proximal RCA disease s/p PCI.      2. Inferolateral ST segment elevation myocardial infarction (July 2019) s/p proximal RCA stenosis.  Complicated by ntermittent AV block prior to PCI.    3. Intermittent PVCs.   4. Hyperlipidemia (LDL: 71- at goal of near 70)   LDL Cholesterol Calculated   Date Value Ref Range Status   08/14/2020 71 <=129 mg/dL Final   5. Smoker, remission  6. Near syncope,     Plan:  1. Metoprolol 25 mg XL daily for PVCs and CAD.   2. ASA 81 mg daily (life long)  3. Atorvastatin 80 mg daily for HLD. LDL at goal and <70.    4. Continue exercise regiment (treadmill)   5. Check lipids with PCP visit (up coming).     Follow-up in 12 months.          History of Present Illness/Subjective    HPI: Jamie Charles is a 52 year old male premature coronary artery disease, proximal RCA stent, history of ST elevated myocardial infarction, hypertension who presents cardiology clinic in follow-up.    Patient presents today in follow-up after emergency room visit in follow-up 2022.  He states that he was noticing lower back pain on his left side and was feeling palpitations throughout a 1 to 2-day period.  While at work he had noticed that the palpitations were worse decided to go to the bathroom while using the bathroom he felt more lightheaded stood up and walked across to his office.  Walking across his office he felt lightheaded like he could pass out but did not lose consciousness.  Given his concerns he called EMS.  On EMS arrival there was no acute EKG changes.  He was transferred to the emergency department.  At the  "emergency department it was noted that he had no evidence of acute myocardial infarction.  He was discharged with diagnosis of near syncope.    Has had no recurrence of these symptoms since.  He underwent a treadmill ECG echo stress test which was negative for ischemia.  He has baseline mild hypokinesis of the inferior lateral wall which improved with exercise.  No EKG changes concerning for ischemia.  He underwent cardiac monitoring which demonstrated no sustained atrial ventricular arrhythmias.  No evidence of bradycardia.    Since early December he is had no palpitations.  Has had no recurrence of symptoms.  He feels well today.  Continues to do intermittent treadmill exercises with no cardiac symptoms.  He continues to do work around his house including shoveling with no difficulty.  He is compliant with all of his medications.    Echocardiogram Results: Echo stress images were reviewed.  Agree there is mild hypokinesis of the basal to mid inferolateral wall.  The wall motion of the inferior lateral wall improve with exercise.  Interpretation Summary  1. Abnormal stress echocardiogram by virtue of baseline imaging suggesting  basal and mid posterior hypokinesis. The specificity of the finding, however,  is reduced due to suboptimal acoustic imaging. This appears to persist  consistent with prior infarction. No obvious ischemia is identified, however.  2. Normal resting LV systolic performance with an ejection fraction of 55-60%.  There is normal improvement in left ventricular systolic performance with a  peak ejection fraction of 70-75%.  3. No ECG evidence of ischemia.  4. No anginal chest pain reported with exercise.  5. Average functional capacity for age.  6. Two(2) PVCs were noted. No other rhythm disturbances identified.       Physical Examination  Review of Systems   Vitals: /76 (BP Location: Left arm, Patient Position: Sitting, Cuff Size: Adult Large)   Pulse 88   Resp 16   Ht 1.778 m (5' 10\")  "  Wt 107.5 kg (237 lb)   BMI 34.01 kg/m    BMI= Body mass index is 34.01 kg/m .  Wt Readings from Last 3 Encounters:   01/12/23 107.5 kg (237 lb)   08/04/22 106.6 kg (235 lb)   08/14/20 107 kg (236 lb)        Pleasant, mildly obese   ENT/Mouth: membranes moist, no oral lesions or bleeding gums.      EYES:  no scleral icterus, normal conjunctivae       Chest/Lungs:   lungs are clear to auscultation, no rales or wheezing, no sternal scar, equal chest wall expansion    Cardiovascular:   Regular. Normal first and second heart sounds with no murmurs, rubs, or gallops; the carotid, radial and posterior tibial pulses are intact, Jugular venous pressure normal, no edema bilaterally.    Abdomen:  no tenderness; bowel sounds are present   Extremities: no cyanosis or clubbing   Skin: no xanthelasma, warm.    Neurologic: normal  bilateral, no tremors     Psychiatric: alert and oriented x3, calm        Please refer above for cardiac ROS details.        Medical History  Surgical History Family History Social History   Past Medical History:   Diagnosis Date     Acute ST elevation myocardial infarction (STEMI) involving other coronary artery of inferior wall (H)      CAD (coronary artery disease)      HLD (hyperlipidemia)      Past Surgical History:   Procedure Laterality Date     ANGIOPLASTY  07/10/2019    DAVID to pRCA for STEMI     CV CORONARY ANGIOGRAM N/A 7/10/2019    Procedure: Coronary Angiogram;  Surgeon: Itz Alicea MD;  Location: [facility];  Service: Cardiology     CV LEFT HEART CATHETERIZATION WITH LEFT VENTRICULOGRAM N/A 7/10/2019    Procedure: Left Heart Catheterization with Left Ventriculogram;  Surgeon: Itz Alicea MD;  Location: [facility] Lab;  Service: Cardiology     Family History   Problem Relation Age of Onset     Diabetes Mother      Diverticulitis Father      No Known Problems Sister      No Known Problems Brother      No Known Problems Brother         Social History      Socioeconomic History     Marital status: Single     Spouse name: Not on file     Number of children: Not on file     Years of education: Not on file     Highest education level: Not on file   Occupational History     Not on file   Tobacco Use     Smoking status: Former     Packs/day: 0.50     Years: 32.00     Pack years: 16.00     Types: Cigarettes     Start date: 7/11/1987     Quit date: 7/10/2019     Years since quitting: 3.5     Smokeless tobacco: Former     Quit date: 7/11/1999   Substance and Sexual Activity     Alcohol use: Yes     Alcohol/week: 12.0 standard drinks     Drug use: Never     Sexual activity: Yes     Partners: Female   Other Topics Concern     Not on file   Social History Narrative    Lives with his girlfriend/partner, Deepthi.  Works in [employer], industrial laundry.       Social Determinants of Health     Financial Resource Strain: Not on file   Food Insecurity: Not on file   Transportation Needs: Not on file   Physical Activity: Not on file   Stress: Not on file   Social Connections: Not on file   Intimate Partner Violence: Not on file   Housing Stability: Not on file           Medications  Allergies   Current Outpatient Medications   Medication Sig Dispense Refill     aspirin 81 mg chewable tablet [ASPIRIN 81 MG CHEWABLE TABLET] Chew 1 tablet (81 mg total) daily.  0     atorvastatin (LIPITOR) 80 MG tablet Take 1 tablet (80 mg) by mouth At Bedtime 90 tablet 3     clobetasoL (TEMOVATE) 0.05 % ointment [CLOBETASOL (TEMOVATE) 0.05 % OINTMENT] Apply topically 2 (two) times a day. 60 g 1     loratadine (CLARITIN) 10 mg tablet [LORATADINE (CLARITIN) 10 MG TABLET] Take 10 mg by mouth daily as needed for allergies.       magnesium gluconate (MAGONATE) 27 mg magnesium (500 mg) Tab tablet [MAGNESIUM GLUCONATE (MAGONATE) 27 MG MAGNESIUM (500 MG) TAB TABLET] Take 2 tablets (54 mg total) by mouth at bedtime.  0     metoprolol succinate ER (TOPROL XL) 25 MG 24 hr tablet Take 1 tablet (25 mg) by  mouth daily 90 tablet 3     No Known Allergies       Lab Results    Chemistry/lipid CBC Cardiac Enzymes/BNP/TSH/INR   Recent Labs   Lab Test 08/14/20  0900   CHOL 136   HDL 34*   LDL 71   TRIG 153*     Recent Labs   Lab Test 08/14/20  0900 10/18/19  0936 07/12/19  0005   LDL 71 65 150*     Recent Labs   Lab Test 08/14/20  0900      POTASSIUM 4.6   CHLORIDE 105   CO2 26      BUN 10   CR 1.00   GFRESTIMATED >60   VICENTA 9.4     Recent Labs   Lab Test 08/14/20  0900 07/29/19  0914 07/12/19  0005   CR 1.00 1.05 0.81     Recent Labs   Lab Test 08/14/20  0900 07/11/19  0833   A1C 6.0* 6.0          Recent Labs   Lab Test 08/14/20  0900   WBC 7.8   HGB 14.6   HCT 43.3   MCV 90        Recent Labs   Lab Test 08/14/20  0900 07/29/19  0914 07/10/19  2136   HGB 14.6 13.8* 16.3    Recent Labs   Lab Test 07/29/19  2356 07/29/19  1809 07/29/19  1113   TROPONINI 0.04 0.04 0.04     Recent Labs   Lab Test 07/29/19  0914   *     No results for input(s): TSH in the last 65998 hours.  Recent Labs   Lab Test 07/29/19  0914 07/10/19  2136   INR 1.07 0.98        Chaz Cadena DO                Thank you for allowing me to participate in the care of your patient.      Sincerely,     Chaz Cadena DO     [facility]  cc:   Chaz Cadena DO  [address] [place] [address]

## 2023-01-31 ASSESSMENT — ENCOUNTER SYMPTOMS
JOINT SWELLING: 0
MYALGIAS: 0
NAUSEA: 0
WEAKNESS: 0
PALPITATIONS: 0
EYE PAIN: 0
HEMATOCHEZIA: 0
SHORTNESS OF BREATH: 0
CHILLS: 0
FREQUENCY: 0
HEMATURIA: 0
HEADACHES: 0
PARESTHESIAS: 1
FEVER: 0
DIZZINESS: 0
HEARTBURN: 0
DYSURIA: 0
ABDOMINAL PAIN: 0
CONSTIPATION: 0
COUGH: 0
DIARRHEA: 0
NERVOUS/ANXIOUS: 0
ARTHRALGIAS: 0
SORE THROAT: 0

## 2023-02-06 ENCOUNTER — OFFICE VISIT (OUTPATIENT)
Dept: INTERNAL MEDICINE | Facility: CLINIC | Age: 53
End: 2023-02-06
Payer: COMMERCIAL

## 2023-02-06 VITALS
DIASTOLIC BLOOD PRESSURE: 75 MMHG | OXYGEN SATURATION: 100 % | HEIGHT: 71 IN | HEART RATE: 81 BPM | WEIGHT: 237 LBS | TEMPERATURE: 97.8 F | RESPIRATION RATE: 13 BRPM | BODY MASS INDEX: 33.18 KG/M2 | SYSTOLIC BLOOD PRESSURE: 122 MMHG

## 2023-02-06 DIAGNOSIS — R73.9 HYPERGLYCEMIA: ICD-10-CM

## 2023-02-06 DIAGNOSIS — E66.811 CLASS 1 OBESITY DUE TO EXCESS CALORIES WITH SERIOUS COMORBIDITY AND BODY MASS INDEX (BMI) OF 33.0 TO 33.9 IN ADULT: ICD-10-CM

## 2023-02-06 DIAGNOSIS — Z00.00 ANNUAL PHYSICAL EXAM: Primary | ICD-10-CM

## 2023-02-06 DIAGNOSIS — I25.118 CORONARY ARTERY DISEASE INVOLVING NATIVE CORONARY ARTERY OF NATIVE HEART WITH OTHER FORM OF ANGINA PECTORIS (H): ICD-10-CM

## 2023-02-06 DIAGNOSIS — I10 ESSENTIAL HYPERTENSION WITH GOAL BLOOD PRESSURE LESS THAN 140/90: ICD-10-CM

## 2023-02-06 DIAGNOSIS — E66.09 CLASS 1 OBESITY DUE TO EXCESS CALORIES WITH SERIOUS COMORBIDITY AND BODY MASS INDEX (BMI) OF 33.0 TO 33.9 IN ADULT: ICD-10-CM

## 2023-02-06 DIAGNOSIS — E78.2 MIXED HYPERLIPIDEMIA: ICD-10-CM

## 2023-02-06 DIAGNOSIS — I25.5 ISCHEMIC CARDIOMYOPATHY: ICD-10-CM

## 2023-02-06 DIAGNOSIS — R06.83 SNORING: ICD-10-CM

## 2023-02-06 DIAGNOSIS — R09.81 NASAL CONGESTION: ICD-10-CM

## 2023-02-06 DIAGNOSIS — L30.1 DYSHIDROTIC ECZEMA: ICD-10-CM

## 2023-02-06 DIAGNOSIS — Z12.5 SCREENING FOR PROSTATE CANCER: ICD-10-CM

## 2023-02-06 DIAGNOSIS — Z12.11 SCREEN FOR COLON CANCER: ICD-10-CM

## 2023-02-06 LAB
ALBUMIN SERPL BCG-MCNC: 4.5 G/DL (ref 3.5–5.2)
ALBUMIN UR-MCNC: ABNORMAL MG/DL
ALP SERPL-CCNC: 120 U/L (ref 40–129)
ALT SERPL W P-5'-P-CCNC: 34 U/L (ref 10–50)
ANION GAP SERPL CALCULATED.3IONS-SCNC: 11 MMOL/L (ref 7–15)
APPEARANCE UR: CLEAR
AST SERPL W P-5'-P-CCNC: 27 U/L (ref 10–50)
BACTERIA #/AREA URNS HPF: ABNORMAL /HPF
BILIRUB SERPL-MCNC: 0.6 MG/DL
BILIRUB UR QL STRIP: NEGATIVE
BUN SERPL-MCNC: 11.4 MG/DL (ref 6–20)
CALCIUM SERPL-MCNC: 9.6 MG/DL (ref 8.6–10)
CHLORIDE SERPL-SCNC: 104 MMOL/L (ref 98–107)
CHOLEST SERPL-MCNC: 159 MG/DL
COLOR UR AUTO: YELLOW
CREAT SERPL-MCNC: 0.92 MG/DL (ref 0.67–1.17)
DEPRECATED HCO3 PLAS-SCNC: 26 MMOL/L (ref 22–29)
ERYTHROCYTE [DISTWIDTH] IN BLOOD BY AUTOMATED COUNT: 12.5 % (ref 10–15)
GFR SERPL CREATININE-BSD FRML MDRD: >90 ML/MIN/1.73M2
GLUCOSE SERPL-MCNC: 121 MG/DL (ref 70–99)
GLUCOSE UR STRIP-MCNC: NEGATIVE MG/DL
HBA1C MFR BLD: 6.1 % (ref 0–5.6)
HCT VFR BLD AUTO: 43.1 % (ref 40–53)
HDLC SERPL-MCNC: 40 MG/DL
HGB BLD-MCNC: 14.3 G/DL (ref 13.3–17.7)
HGB UR QL STRIP: NEGATIVE
KETONES UR STRIP-MCNC: NEGATIVE MG/DL
LDLC SERPL CALC-MCNC: 86 MG/DL
LEUKOCYTE ESTERASE UR QL STRIP: NEGATIVE
MCH RBC QN AUTO: 29.2 PG (ref 26.5–33)
MCHC RBC AUTO-ENTMCNC: 33.2 G/DL (ref 31.5–36.5)
MCV RBC AUTO: 88 FL (ref 78–100)
MUCOUS THREADS #/AREA URNS LPF: PRESENT /LPF
NITRATE UR QL: NEGATIVE
NONHDLC SERPL-MCNC: 119 MG/DL
PH UR STRIP: 5.5 [PH] (ref 5–8)
PLATELET # BLD AUTO: 240 10E3/UL (ref 150–450)
POTASSIUM SERPL-SCNC: 4.2 MMOL/L (ref 3.4–5.3)
PROT SERPL-MCNC: 7.6 G/DL (ref 6.4–8.3)
PSA SERPL-MCNC: 0.25 NG/ML (ref 0–3.5)
RBC # BLD AUTO: 4.9 10E6/UL (ref 4.4–5.9)
RBC #/AREA URNS AUTO: ABNORMAL /HPF
SODIUM SERPL-SCNC: 141 MMOL/L (ref 136–145)
SP GR UR STRIP: >=1.03 (ref 1–1.03)
SQUAMOUS #/AREA URNS AUTO: ABNORMAL /LPF
TRIGL SERPL-MCNC: 166 MG/DL
TSH SERPL DL<=0.005 MIU/L-ACNC: 2.41 UIU/ML (ref 0.3–4.2)
UROBILINOGEN UR STRIP-ACNC: 0.2 E.U./DL
WBC # BLD AUTO: 7.3 10E3/UL (ref 4–11)
WBC #/AREA URNS AUTO: ABNORMAL /HPF

## 2023-02-06 PROCEDURE — 99214 OFFICE O/P EST MOD 30 MIN: CPT | Mod: 25 | Performed by: INTERNAL MEDICINE

## 2023-02-06 PROCEDURE — 81001 URINALYSIS AUTO W/SCOPE: CPT | Performed by: INTERNAL MEDICINE

## 2023-02-06 PROCEDURE — 99396 PREV VISIT EST AGE 40-64: CPT | Performed by: INTERNAL MEDICINE

## 2023-02-06 PROCEDURE — 84443 ASSAY THYROID STIM HORMONE: CPT | Performed by: INTERNAL MEDICINE

## 2023-02-06 PROCEDURE — G0103 PSA SCREENING: HCPCS | Performed by: INTERNAL MEDICINE

## 2023-02-06 PROCEDURE — 80061 LIPID PANEL: CPT | Performed by: INTERNAL MEDICINE

## 2023-02-06 PROCEDURE — 80053 COMPREHEN METABOLIC PANEL: CPT | Performed by: INTERNAL MEDICINE

## 2023-02-06 PROCEDURE — 85027 COMPLETE CBC AUTOMATED: CPT | Performed by: INTERNAL MEDICINE

## 2023-02-06 PROCEDURE — 36415 COLL VENOUS BLD VENIPUNCTURE: CPT | Performed by: INTERNAL MEDICINE

## 2023-02-06 PROCEDURE — 83036 HEMOGLOBIN GLYCOSYLATED A1C: CPT | Performed by: INTERNAL MEDICINE

## 2023-02-06 RX ORDER — CLOBETASOL PROPIONATE 0.5 MG/G
OINTMENT TOPICAL 2 TIMES DAILY
Qty: 60 G | Refills: 1 | Status: SHIPPED | OUTPATIENT
Start: 2023-02-06 | End: 2024-09-26

## 2023-02-06 NOTE — PROGRESS NOTES
Office Visit - Physical   Jamie Chrales   52 year old  male    Date of visit: 2/6/2023  Physician: Ji Albright MD     Assessment and Plan   1. Annual physical exam  This is a 52-year-old man with issues as discussed below    2. Screen for colon cancer  - Colonoscopy Screening  Referral; Future    3. Screening for prostate cancer  - Prostate Specific Antigen Screen; Future  - Prostate Specific Antigen Screen    4. CAD, STEMI '19, DAVID RCA  5. Ischemic cardiomyopathy  Reviewed labs, reviewed his Holter monitor which looked okay, reviewed his echocardiogram which showed wall motion abnormality but no reduction in ejection fraction, continue secondary prevention, if lipids elevated consider addition of ezetimibe  - CBC with platelets; Future  - Comprehensive metabolic panel; Future  - Lipid panel reflex to direct LDL Fasting; Future  - CBC with platelets  - Comprehensive metabolic panel  - Lipid panel reflex to direct LDL Fasting    6. Essential hypertension with goal blood pressure less than 140/90  Blood pressure okay continues    7. Mixed hyperlipidemia  As above  - TSH with free T4 reflex; Future  - UA reflex to Microscopic and Culture; Future  - TSH with free T4 reflex  - UA reflex to Microscopic and Culture  - Urine Microscopic    8. Snoring  May be more related to his nasal passages rather than sleep apnea and have recommended he see Dr. Noriega  - Adult ENT  Referral; Future    9. Nasal congestion  - Adult ENT  Referral; Future    10. Hyperglycemia   Hemoglobin A1c; Future  - Hemoglobin A1c    11. Dyshidrotic eczema  - clobetasol (TEMOVATE) 0.05 % external ointment; Apply topically 2 times daily  Dispense: 60 g; Refill: 1    12. Class 1 obesity due to excess calories with serious comorbidity and body mass index (BMI) of 33.0 to 33.9 in adult  The following high BMI interventions were performed this visit: encouragement to exercise and lifestyle education regarding diet    Return  in about 1 year (around 2/6/2024) for Routine preventive.     Chief Complaint   Chief Complaint   Patient presents with     Physical     Annual physical/patient is fasting.     Recheck Medication        Patient Profile   Social History     Social History Narrative    Lives with his girlfriend/partner, Deepthi.  Works in [employer], industrial laundry.          Past Medical History   Patient Active Problem List   Diagnosis     Essential hypertension with goal blood pressure less than 140/90     Mixed hyperlipidemia     Ischemic cardiomyopathy     CAD, STEMI '19, DAVID RCA     Snoring     Nasal congestion       Past Surgical History  He has a past surgical history that includes Angioplasty (07/10/2019); Cv Coronary Angiogram (N/A, 7/10/2019); and Cv Left Heart Catheterization With Left Ventriculogram (N/A, 7/10/2019).     History of Present Illness   This 52 year old man comes in for annual physical and follow-up.  Its been a couple of years since have seen him.  Overall he is doing well.  Would like to lose a little bit of weight.  He has had some snoring.  Feels rested in the mornings.  No daytime sleepiness.  Lots of nasal congestion.  Taking medications as prescribed.    Review of Systems: A comprehensive review of systems was negative except as noted.     Medications and Allergies   Current Outpatient Medications   Medication Sig Dispense Refill     aspirin 81 mg chewable tablet [ASPIRIN 81 MG CHEWABLE TABLET] Chew 1 tablet (81 mg total) daily.  0     atorvastatin (LIPITOR) 80 MG tablet Take 1 tablet (80 mg) by mouth At Bedtime 90 tablet 3     clobetasol (TEMOVATE) 0.05 % external ointment Apply topically 2 times daily 60 g 1     magnesium gluconate (MAGONATE) 27 mg magnesium (500 mg) Tab tablet [MAGNESIUM GLUCONATE (MAGONATE) 27 MG MAGNESIUM (500 MG) TAB TABLET] Take 2 tablets (54 mg total) by mouth at bedtime.  0     metoprolol succinate ER (TOPROL XL) 25 MG 24 hr tablet Take 1 tablet (25 mg) by mouth daily  "90 tablet 3     No Known Allergies     Family and Social History   Family History   Problem Relation Age of Onset     Diabetes Mother      Diverticulitis Father      No Known Problems Sister      No Known Problems Brother      No Known Problems Brother         Social History     Tobacco Use     Smoking status: Former     Packs/day: 0.50     Years: 32.00     Pack years: 16.00     Types: Cigarettes     Start date: 7/11/1987     Quit date: 7/10/2019     Years since quitting: 3.5     Smokeless tobacco: Former     Quit date: 7/11/1999   Vaping Use     Vaping Use: Never used   Substance Use Topics     Alcohol use: Not Currently     Comment: few drinks 2 nights per week     Drug use: Never        Physical Exam   General Appearance:   No acute distress    /75 (BP Location: Left arm, Patient Position: Sitting, Cuff Size: Adult Large)   Pulse 81   Temp 97.8  F (36.6  C) (Tympanic)   Resp 13   Ht 1.803 m (5' 11\")   Wt 107.5 kg (237 lb)   SpO2 100%   BMI 33.05 kg/m      EYES: Eyelids, conjunctiva, and sclera were normal. Pupils were normal. Cornea, iris, and lens were normal bilaterally.  HEAD, EARS, NOSE, MOUTH, AND THROAT: Head and face were normal. Hearing was normal to voice and the ears were normal to external exam.  I think he might have a nasal polyp on the right side and 7 septal deviation  NECK: Neck appearance was normal. There were no neck masses and the thyroid was not enlarged.  RESPIRATORY: Breathing pattern was normal and the chest moved symmetrically.  Percussion/auscultatory percussion was normal.  Lung sounds were normal and there were no abnormal sounds.  CARDIOVASCULAR: Heart rate and rhythm were normal.  S1 and S2 were normal and there were no extra sounds or murmurs. Peripheral pulses in arms and legs were normal.  Jugular venous pressure was normal.  There was no peripheral edema.  GASTROINTESTINAL: The abdomen was normal in contour.  Bowel sounds were present.  Percussion detected no organ " enlargement or tenderness.  Palpation detected no tenderness, mass, or enlarged organs.   MUSCULOSKELETAL: Skeletal configuration was normal and muscle mass was normal for age. Joint appearance was overall normal.  LYMPHATIC: There were no enlarged nodes.  SKIN/HAIR/NAILS: Skin color was normal.  There were no skin lesions.  Hair and nails were normal.  NEUROLOGIC: The patient was alert and oriented to person, place, time, and circumstance. Speech was normal. Cranial nerves were normal. Motor strength was normal for age. The patient was normally coordinated.  PSYCHIATRIC:  Mood and affect were normal and the patient had normal recent and remote memory. The patient's judgment and insight were normal.       Additional Information        Ji Albright MD  Internal Medicine  Contact me at [phone]  Answers for HPI/ROS submitted by the patient on 1/31/2023  Frequency of exercise:: 2-3 days/week  Getting at least 3 servings of Calcium per day:: NO  Diet:: Regular (no restrictions)  Taking medications regularly:: Yes  Medication side effects:: None  Bi-annual eye exam:: Yes  Dental care twice a year:: Yes  Sleep apnea or symptoms of sleep apnea:: Excessive snoring  abdominal pain: No  Blood in stool: No  Blood in urine: No  chest pain: No  chills: No  congestion: No  constipation: No  cough: No  diarrhea: No  dizziness: No  ear pain: No  eye pain: No  nervous/anxious: No  fever: No  frequency: No  genital sores: No  headaches: No  hearing loss: No  heartburn: No  arthralgias: No  joint swelling: No  peripheral edema: No  mood changes: No  myalgias: No  nausea: No  dysuria: No  palpitations: No  Skin sensation changes: Yes  sore throat: No  urgency: No  rash: No  shortness of breath: No  visual disturbance: No  weakness: No  impotence: No  penile discharge: No  Additional concerns today:: No  Duration of exercise:: 15-30 minutes

## 2023-05-22 ENCOUNTER — TELEPHONE (OUTPATIENT)
Dept: INTERNAL MEDICINE | Facility: CLINIC | Age: 53
End: 2023-05-22
Payer: COMMERCIAL

## 2023-05-22 NOTE — TELEPHONE ENCOUNTER
May 22, 2023    Preop form was received via fax for Dr. Albright to sign.  Patient label was attached to paperwork and placed in provider's inbox to be signed.    Pam J. Behr

## 2023-07-20 ENCOUNTER — OFFICE VISIT (OUTPATIENT)
Dept: INTERNAL MEDICINE | Facility: CLINIC | Age: 53
End: 2023-07-20
Payer: COMMERCIAL

## 2023-07-20 VITALS
SYSTOLIC BLOOD PRESSURE: 126 MMHG | TEMPERATURE: 97.7 F | OXYGEN SATURATION: 97 % | HEART RATE: 71 BPM | BODY MASS INDEX: 34.73 KG/M2 | RESPIRATION RATE: 18 BRPM | DIASTOLIC BLOOD PRESSURE: 82 MMHG | HEIGHT: 71 IN | WEIGHT: 248.1 LBS

## 2023-07-20 DIAGNOSIS — E78.2 MIXED HYPERLIPIDEMIA: ICD-10-CM

## 2023-07-20 DIAGNOSIS — I25.5 ISCHEMIC CARDIOMYOPATHY: ICD-10-CM

## 2023-07-20 DIAGNOSIS — Z01.818 PREOPERATIVE EXAMINATION: Primary | ICD-10-CM

## 2023-07-20 DIAGNOSIS — I25.118 CORONARY ARTERY DISEASE INVOLVING NATIVE CORONARY ARTERY OF NATIVE HEART WITH OTHER FORM OF ANGINA PECTORIS (H): ICD-10-CM

## 2023-07-20 DIAGNOSIS — J34.2 NASAL SEPTAL DEVIATION: ICD-10-CM

## 2023-07-20 DIAGNOSIS — G47.33 OSA ON CPAP: ICD-10-CM

## 2023-07-20 DIAGNOSIS — L30.1 DYSHIDROTIC ECZEMA: ICD-10-CM

## 2023-07-20 DIAGNOSIS — I10 ESSENTIAL HYPERTENSION WITH GOAL BLOOD PRESSURE LESS THAN 140/90: ICD-10-CM

## 2023-07-20 LAB
ANION GAP SERPL CALCULATED.3IONS-SCNC: 13 MMOL/L (ref 7–15)
ATRIAL RATE - MUSE: 69 BPM
BUN SERPL-MCNC: 14.9 MG/DL (ref 6–20)
CALCIUM SERPL-MCNC: 9.4 MG/DL (ref 8.6–10)
CHLORIDE SERPL-SCNC: 104 MMOL/L (ref 98–107)
CREAT SERPL-MCNC: 0.93 MG/DL (ref 0.67–1.17)
DEPRECATED HCO3 PLAS-SCNC: 23 MMOL/L (ref 22–29)
DIASTOLIC BLOOD PRESSURE - MUSE: NORMAL MMHG
GFR SERPL CREATININE-BSD FRML MDRD: >90 ML/MIN/1.73M2
GLUCOSE SERPL-MCNC: 115 MG/DL (ref 70–99)
INTERPRETATION ECG - MUSE: NORMAL
P AXIS - MUSE: -7 DEGREES
POTASSIUM SERPL-SCNC: 4.1 MMOL/L (ref 3.4–5.3)
PR INTERVAL - MUSE: 150 MS
QRS DURATION - MUSE: 108 MS
QT - MUSE: 390 MS
QTC - MUSE: 417 MS
R AXIS - MUSE: 14 DEGREES
SODIUM SERPL-SCNC: 140 MMOL/L (ref 136–145)
SYSTOLIC BLOOD PRESSURE - MUSE: NORMAL MMHG
T AXIS - MUSE: -5 DEGREES
VENTRICULAR RATE- MUSE: 69 BPM

## 2023-07-20 PROCEDURE — 93010 ELECTROCARDIOGRAM REPORT: CPT | Performed by: INTERNAL MEDICINE

## 2023-07-20 PROCEDURE — 36415 COLL VENOUS BLD VENIPUNCTURE: CPT | Performed by: INTERNAL MEDICINE

## 2023-07-20 PROCEDURE — 99214 OFFICE O/P EST MOD 30 MIN: CPT | Performed by: INTERNAL MEDICINE

## 2023-07-20 PROCEDURE — 80048 BASIC METABOLIC PNL TOTAL CA: CPT | Performed by: INTERNAL MEDICINE

## 2023-07-20 PROCEDURE — 93005 ELECTROCARDIOGRAM TRACING: CPT | Performed by: INTERNAL MEDICINE

## 2023-07-20 NOTE — PROGRESS NOTES
Preoperative Consultation   Jamei Charles   53 year old  male    Date of visit: 7/20/2023  Physician: Ji Albright MD    This is a preoperative consultation requested by Dr. Noriega in preparation for nasal septoplasty on 8/11/2023 at [facility], fax [phone]/ [phone]       Assessment and Plan   Jamie Charles was seen in preoperative consultation in preparation for nasal septoplasty.  This is a low risk surgery and the patient has increased risk for major cardiac complications based on a history of myocardial infarction and ECG with pathological Q waves.  Please note he will hold aspirin 1 week prior to surgery.  He will be sure to take the beta-blocker as he normally does the evening prior to the procedure.  He does have a history of obstructive sleep apnea for which she uses CPAP.  He does have a prior history of smoking but no known pulmonary disease.    1. Preoperative examination    2. Nasal septal deviation    3. CAD, STEMI '19, DAVID RCA    4. Ischemic cardiomyopathy    5. Essential hypertension with goal blood pressure less than 140/90    6. Mixed hyperlipidemia    7. TERRANCE on CPAP         Patient Profile   Social History     Social History Narrative    Lives with his girlfriend/partner, Deepthi.  Works in [employer], industrial laundry.          Past Medical History   Patient Active Problem List   Diagnosis     Essential hypertension with goal blood pressure less than 140/90     Mixed hyperlipidemia     Ischemic cardiomyopathy     CAD, STEMI '19, DAVID RCA     TERRANCE on CPAP       Past Surgical History  He has a past surgical history that includes Angioplasty (07/10/2019); Cv Coronary Angiogram (N/A, 7/10/2019); and Cv Left Heart Catheterization With Left Ventriculogram (N/A, 7/10/2019).     History of Present Illness   This 53 year old man comes in for preoperative evaluation.  He has septal deviation and septoplasty has been recommended.  Recently diagnosed with obstructive sleep apnea and  tolerating CPAP and has gotten used to it which is going much better.  He has a history of coronary artery disease and had a myocardial infarction in 2019 with a stent to the RCA.  He has subsequently done well and stop smoking.  No ongoing chest pain or shortness of breath.  No infectious symptoms.    Recent antiplatelet use: yes aspirin  Personal or family history of bleeding or clotting disorders: no  Steroid use in the past year: no  Personal or family history of difficulty with anesthesia: no  Current cardiopulmonary symptoms: no  TERRANCE: yes, on CPAP    Review of Systems: A comprehensive review of systems was negative except as noted.     Medications and Allergies   Current Outpatient Medications   Medication Sig Dispense Refill     aspirin 81 mg chewable tablet [ASPIRIN 81 MG CHEWABLE TABLET] Chew 1 tablet (81 mg total) daily.  0     atorvastatin (LIPITOR) 80 MG tablet Take 1 tablet (80 mg) by mouth At Bedtime 90 tablet 3     clobetasol (TEMOVATE) 0.05 % external ointment Apply topically 2 times daily 60 g 1     magnesium gluconate (MAGONATE) 27 mg magnesium (500 mg) Tab tablet [MAGNESIUM GLUCONATE (MAGONATE) 27 MG MAGNESIUM (500 MG) TAB TABLET] Take 2 tablets (54 mg total) by mouth at bedtime.  0     metoprolol succinate ER (TOPROL XL) 25 MG 24 hr tablet Take 1 tablet (25 mg) by mouth daily 90 tablet 3     No Known Allergies     Family and Social History   Family History   Problem Relation Age of Onset     Diabetes Mother      Diverticulitis Father      No Known Problems Sister      No Known Problems Brother      No Known Problems Brother         Social History     Tobacco Use     Smoking status: Former     Packs/day: 0.50     Years: 32.00     Pack years: 16.00     Types: Cigarettes     Start date: 1987     Quit date: 7/10/2019     Years since quittin.0     Smokeless tobacco: Former     Quit date: 1999   Vaping Use     Vaping Use: Never used   Substance Use Topics     Alcohol use: Not Currently     " Comment: few drinks 2 nights per week     Drug use: Never        Physical Exam   General Appearance:   No acute distress    /82 (BP Location: Left arm, Patient Position: Sitting, Cuff Size: Adult Large)   Pulse 71   Temp 97.7  F (36.5  C) (Oral)   Resp 18   Ht 1.81 m (5' 11.26\")   Wt 112.5 kg (248 lb 1.6 oz)   SpO2 97%   BMI 34.35 kg/m      EYES: Eyelids, conjunctiva, and sclera were normal. Pupils were normal.   HEAD, EARS, NOSE, MOUTH, AND THROAT: Head and face were normal.   NECK: Neck appearance was normal. There were no neck masses and the thyroid was not enlarged.  RESPIRATORY: Breathing pattern was normal and the chest moved symmetrically.  Percussion/auscultatory percussion was normal.  Lung sounds were normal and there were no abnormal sounds.  CARDIOVASCULAR: Heart rate and rhythm were normal.  S1 and S2 were normal and there were no extra sounds or murmurs. Peripheral pulses in arms and legs were normal.  Jugular venous pressure was normal.  There was no peripheral edema.  GASTROINTESTINAL: The abdomen was normal in contour.    NEUROLOGIC: The patient was alert and oriented to person, place, time, and circumstance. Speech was normal. Cranial nerves were normal. Motor strength was normal for age. The patient was normally coordinated.  PSYCHIATRIC:  Mood and affect were normal and the patient had normal recent and remote memory. The patient's judgment and insight were normal.     Additional Tests   Laboratory: Basic metabolic panel is pending, was normal in February 2023, CBC normal February 2023    Radiology:     Electrocardiogram: Normal sinus rhythm with stable appearing Q waves, personally reviewed the    Total time including chart review, history, examination, counseling and documentation     Ji Albright MD  Internal Medicine  Contact me at [phone]  "

## 2023-07-20 NOTE — PATIENT INSTRUCTIONS
Hold aspirin 1 week prior to surgery - restart after surgery    Ok to take other medications the evening prior to surgery and make sure you take metoprolol the evening prior to surgery

## 2023-10-07 DIAGNOSIS — I49.3 PVC'S (PREMATURE VENTRICULAR CONTRACTIONS): ICD-10-CM

## 2023-10-07 DIAGNOSIS — I25.10 CORONARY ARTERY DISEASE INVOLVING NATIVE CORONARY ARTERY OF NATIVE HEART WITHOUT ANGINA PECTORIS: ICD-10-CM

## 2023-10-07 DIAGNOSIS — I10 ESSENTIAL HYPERTENSION: ICD-10-CM

## 2023-10-07 DIAGNOSIS — E78.2 MIXED HYPERLIPIDEMIA: ICD-10-CM

## 2023-10-10 RX ORDER — METOPROLOL SUCCINATE 25 MG/1
25 TABLET, EXTENDED RELEASE ORAL DAILY
Qty: 90 TABLET | Refills: 0 | Status: SHIPPED | OUTPATIENT
Start: 2023-10-10 | End: 2023-12-22

## 2023-10-10 RX ORDER — ATORVASTATIN CALCIUM 80 MG/1
80 TABLET, FILM COATED ORAL AT BEDTIME
Qty: 90 TABLET | Refills: 0 | Status: SHIPPED | OUTPATIENT
Start: 2023-10-10 | End: 2024-01-23

## 2023-12-22 DIAGNOSIS — I10 ESSENTIAL HYPERTENSION: ICD-10-CM

## 2023-12-22 DIAGNOSIS — I49.3 PVC'S (PREMATURE VENTRICULAR CONTRACTIONS): ICD-10-CM

## 2023-12-22 DIAGNOSIS — I25.10 CORONARY ARTERY DISEASE INVOLVING NATIVE CORONARY ARTERY OF NATIVE HEART WITHOUT ANGINA PECTORIS: ICD-10-CM

## 2023-12-22 RX ORDER — METOPROLOL SUCCINATE 25 MG/1
25 TABLET, EXTENDED RELEASE ORAL DAILY
Qty: 90 TABLET | Refills: 0 | Status: SHIPPED | OUTPATIENT
Start: 2023-12-22 | End: 2024-01-23

## 2024-01-08 ENCOUNTER — PATIENT OUTREACH (OUTPATIENT)
Dept: CARE COORDINATION | Facility: CLINIC | Age: 54
End: 2024-01-08
Payer: COMMERCIAL

## 2024-01-22 ENCOUNTER — PATIENT OUTREACH (OUTPATIENT)
Dept: CARE COORDINATION | Facility: CLINIC | Age: 54
End: 2024-01-22
Payer: COMMERCIAL

## 2024-01-22 NOTE — PROGRESS NOTES
HEART CARE ENCOUNTER CONSULTATON NATHANIEL BROWN [facility]  [phone]      Assessment/Recommendations   Assessment:   Coronary artery disease: s/p STEMI and PCI of RCA 7/2019 with thrombectomy and [place] stent - no angina on aspirin   Hyperlipidemia: Last LDL 86, atorvastatin 80 mg  - We discussed possible addition of Zetia with LDL above goal 1 year ago if LDL remains elevated on next lipid panel.  Patient recalls he was not fasting last year for lab.  PVCs: Metoprolol succinate   TERRANCE: uses CPAP nightly  History of tobacco use    Plan:   Will be having lipid panel with PCP at annual this year  Continue current medications as prescribed, refills provided  Continue nightly CPAP  Continue exercise, discussed improvement in heart healthy diet      Follow up in 1 year      History of Present Illness/Subjective    HPI: Jamie Charles is a 53 year old male with PMHx of CAD/STEMI/PCI 2019, HLD, PVCs, TERRANCE, history of tobacco use presents for follow up.  David reports feeling very well, no new concerns or changes since 1 year ago.  He denies any chest discomfort or pain.  He is exercising 3 to 4 hours a week with treadmill, arm bike, light weight lifting.  History of PVCs, and can recall about half a dozen episodes of single strong palpitation lasting seconds.  Considers these very well-controlled on metoprolol. Is planning to schedule follow-up with PCP for annual visit.  Recalls lipid panel with LDL above goal 1 year ago, recalls that he was not fasting appropriately for that test.  Discussion with primary Dr. Albright to make lifestyle changes and recheck in 1 year.  Admits to being less strict with his diet in the last year.    Of note he had ENT procedure for deviated septum and turbinate hypertrophy 8/2023 with much improved breathing, continues to use CPAP nightly with good sleep, denies fatigue.    He denies lightheadedness, shortness of breath, dyspnea on exertion, palpitations, and lower extremity  edema.      Exercise echocardiogram stress test 11/2022:  Interpretation Summary  1. Abnormal stress echocardiogram by virtue of baseline imaging suggesting  basal and mid posterior hypokinesis. The specificity of the finding, however,  is reduced due to suboptimal acoustic imaging. This appears to persist  consistent with prior infarction. No obvious ischemia is identified, however.  2. Normal resting LV systolic performance with an ejection fraction of 55-60%.  There is normal improvement in left ventricular systolic performance with a  peak ejection fraction of 70-75%.  3. No ECG evidence of ischemia.  4. No anginal chest pain reported with exercise.  5. Average functional capacity for age.  6. Two(2) PVCs were noted. No other rhythm disturbances identified.     Stress Summary: Patient exercised on the Aaron protocol for a total of 9:00  minutes. Peak heart rate achieved was 138 b.p.m (82% max.) with a double-  product of 22,780. The patient stopped exercise due to generalized fatigue. No  chest pain symptoms were reported.     Electrocardiogram: Baseline ECG reveals normal sinus rhythm. There is ECG  criteria for inferior infarct. With exercise, there are no significant ECG  changes when compared to baseline to suggest ischemia. Two(2) PVCs were noted.  No other rhythm disturbances identified.     Baseline echocardiogram: Technically fair images were obtained in the standard  quad-screen format. LV systolic performance is normal with a visually  estimated ejection fraction of 55-60%. Baseline imaging suggests basal and mid  posterior hypokinesis. The specificity of the finding, however, is reduced due  to suboptimal acoustic imaging. No significant valvular heart disease is  identified on limited screening Doppler.     Postexercise echocardiogram: Technically fair images were obtained immediately  post exercise in the standard quad-screen format. LV systolic performance  normally improves with a peak ejection  fraction of 70-75%. On selected views,  the basal and mid posterior region of hypokinesis appears to persist  consistent with history of prior infarction. No obvious ischemia is detected,  however.  ______________________________________________________________________________  Stress  The patient did not exhibit any symptoms during exercise.  RPP 97744.  Exercise was stopped due to fatigue.  Patient was given 4ml mixture of 1.5ml Definity and 8.5ml saline.     Stress Results             Protocol:  Aaron          Maximum Predicted HR:   168 bpm             Target HR: 143 bpm        % Maximum Predicted HR: 82 %                             Stage  DurationHeart Rate  BP                                  (mm:ss)   (bpm)                           Peak    9:00      138   170/76                        RecoveryR  6:13      90    134/79             Stress Duration:   9:00 mm:ss        Recovery Time: 6:13 mm:ss          Maximum Stress HR: 138 bpm           METS:          10    Holter monitoring from 11/30/2022 to 12/1/2022 (duration 24hrs). Predominant underlying rhythm was sinus rhythm, 59 to 142bpm, average 78bpm. 1 episode of nonsustained atrial tachycardia, 8 beats, 144bpm. No sustained tachyarrhythmias. No atrial fibrillation. There were no pauses of greater than 3 seconds. Rare supraventricular ectopic beats (<1%). Rare premature ventricular contractions (<1%). No symptoms triggered.    Echocardiogram 7/2019 Results:    Normal left ventricular size and systolic function.    Left ventricle ejection fraction is normal. The calculated left ventricular ejection fraction is 65%.    No previous study for comparison.    Normal right ventricular size and systolic function.    No hemodynamically significant valvular heart abnormalities.    Coronary angiogram 7/2019:    Prox RCA to Mid RCA lesion is 100% stenosed.    Radial access was not utilized for     There are wall motion abnormalities in the left ventricle.    The ejection  "fraction is greater than 55% by visual estimate.     Successful PCI on RCA total occlusion using balloon, thrombectomy and Bairon stent         Physical Examination  Review of Systems   Vitals: /70 (BP Location: Left arm, Patient Position: Sitting, Cuff Size: Adult Large)   Pulse 75   Resp 16   Ht 1.81 m (5' 11.26\")   Wt 113.9 kg (251 lb)   SpO2 98%   BMI 34.75 kg/m    BMI= Body mass index is 34.75 kg/m .  Wt Readings from Last 3 Encounters:   01/23/24 113.9 kg (251 lb)   07/20/23 112.5 kg (248 lb 1.6 oz)   02/06/23 107.5 kg (237 lb)           ENT/Mouth: membranes moist, no oral lesions or bleeding gums.      EYES:  no scleral icterus, normal conjunctivae                    Neck: No carotid bruit    Chest/Lungs:   lungs are clear to auscultation, no rales or wheezing, equal chest wall expansion    Cardiovascular:   Regular. Normal first and second heart sounds with no murmurs, rubs, or gallops; the carotid, radial and posterior tibial pulses are intact, absent edema bilaterally        Extremities: no cyanosis or clubbing   Skin: no xanthelasma, warm.    Neurologic: no tremors     Psychiatric: alert and oriented x3, calm        Please refer above for cardiac ROS details.        Medical History  Surgical History Family History Social History   Past Medical History:   Diagnosis Date    Acute ST elevation myocardial infarction (STEMI) involving other coronary artery of inferior wall (H)     CAD (coronary artery disease)     HLD (hyperlipidemia)      Past Surgical History:   Procedure Laterality Date    ANGIOPLASTY  07/10/2019    DAVID to pRCA for STEMI    CV CORONARY ANGIOGRAM N/A 7/10/2019    Procedure: Coronary Angiogram;  Surgeon: Itz Alicea MD;  Location: [facility];  Service: Cardiology    CV LEFT HEART CATHETERIZATION WITH LEFT VENTRICULOGRAM N/A 7/10/2019    Procedure: Left Heart Catheterization with Left Ventriculogram;  Surgeon: Itz Alicea MD;  Location: [facility] " Lab;  Service: Cardiology     Family History   Problem Relation Age of Onset    Diabetes Mother     Diverticulitis Father     No Known Problems Sister     No Known Problems Brother     No Known Problems Brother         Social History     Socioeconomic History    Marital status: Single     Spouse name: Not on file    Number of children: Not on file    Years of education: Not on file    Highest education level: Not on file   Occupational History    Not on file   Tobacco Use    Smoking status: Former     Packs/day: 0.50     Years: 32.00     Additional pack years: 0.00     Total pack years: 16.00     Types: Cigarettes     Start date: 1987     Quit date: 7/10/2019     Years since quittin.5    Smokeless tobacco: Former     Quit date: 1999   Vaping Use    Vaping Use: Never used   Substance and Sexual Activity    Alcohol use: Not Currently     Comment: few drinks 2 nights per week    Drug use: Never    Sexual activity: Yes     Partners: Female   Other Topics Concern    Not on file   Social History Narrative    Lives with his girlfriend/partner, Deepthi.  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       Social Determinants of Health     Financial Resource Strain: Not on file   Food Insecurity: Not on file   Transportation Needs: Not on file   Physical Activity: Not on file   Stress: Not on file   Social Connections: Not on file   Interpersonal Safety: Not on file   Housing Stability: Not on file           Medications  Allergies   Current Outpatient Medications   Medication Sig Dispense Refill    aspirin 81 mg chewable tablet [ASPIRIN 81 MG CHEWABLE TABLET] Chew 1 tablet (81 mg total) daily.  0    atorvastatin (LIPITOR) 80 MG tablet TAKE 1 TABLET(80 MG) BY MOUTH AT BEDTIME 90 tablet 0    clobetasol (TEMOVATE) 0.05 % external ointment Apply topically 2 times daily 60 g 1    magnesium gluconate (MAGONATE) 27 mg magnesium (500 mg) Tab tablet [MAGNESIUM GLUCONATE (MAGONATE) 27 MG MAGNESIUM (500 MG) TAB TABLET]  Take 2 tablets (54 mg total) by mouth at bedtime.  0    metoprolol succinate ER (TOPROL XL) 25 MG 24 hr tablet TAKE 1 TABLET(25 MG) BY MOUTH DAILY 90 tablet 0     No Known Allergies       Lab Results    Chemistry/lipid CBC Cardiac Enzymes/BNP/TSH/INR   Recent Labs   Lab Test 02/06/23  0819   CHOL 159   HDL 40   LDL 86   TRIG 166*     Recent Labs   Lab Test 02/06/23  0819 08/14/20  0900 10/18/19  0936   LDL 86 71 65     Recent Labs   Lab Test 07/20/23  0835      POTASSIUM 4.1   CHLORIDE 104   CO2 23   *   BUN 14.9   CR 0.93   GFRESTIMATED >90   VICENTA 9.4     Recent Labs   Lab Test 07/20/23  0835 02/06/23  0819 08/14/20  0900   CR 0.93 0.92 1.00     Recent Labs   Lab Test 02/06/23  0819 08/14/20  0900 07/11/19  0833   A1C 6.1* 6.0* 6.0          Recent Labs   Lab Test 02/06/23  0819   WBC 7.3   HGB 14.3   HCT 43.1   MCV 88        Recent Labs   Lab Test 02/06/23  0819 08/14/20  0900 07/29/19  0914   HGB 14.3 14.6 13.8*    Recent Labs   Lab Test 07/29/19  2356 07/29/19  1809 07/29/19  1113   TROPONINI 0.04 0.04 0.04     Recent Labs   Lab Test 07/29/19  0914   *     Recent Labs   Lab Test 02/06/23  0819   TSH 2.41     Recent Labs   Lab Test 07/29/19  0914 07/10/19  2136   INR 1.07 0.98          This note has been dictated using voice recognition software. Any grammatical, typographical, or context distortions are unintentional and inherent to the software    Bailey Healy PA-C

## 2024-01-23 ENCOUNTER — OFFICE VISIT (OUTPATIENT)
Dept: CARDIOLOGY | Facility: CLINIC | Age: 54
End: 2024-01-23
Payer: COMMERCIAL

## 2024-01-23 VITALS
WEIGHT: 251 LBS | BODY MASS INDEX: 35.14 KG/M2 | SYSTOLIC BLOOD PRESSURE: 108 MMHG | HEART RATE: 75 BPM | RESPIRATION RATE: 16 BRPM | HEIGHT: 71 IN | OXYGEN SATURATION: 98 % | DIASTOLIC BLOOD PRESSURE: 70 MMHG

## 2024-01-23 DIAGNOSIS — I49.3 PVC'S (PREMATURE VENTRICULAR CONTRACTIONS): ICD-10-CM

## 2024-01-23 DIAGNOSIS — I25.10 CORONARY ARTERY DISEASE INVOLVING NATIVE CORONARY ARTERY OF NATIVE HEART WITHOUT ANGINA PECTORIS: Primary | ICD-10-CM

## 2024-01-23 DIAGNOSIS — E78.2 MIXED HYPERLIPIDEMIA: ICD-10-CM

## 2024-01-23 DIAGNOSIS — I10 ESSENTIAL HYPERTENSION: ICD-10-CM

## 2024-01-23 DIAGNOSIS — G47.33 OSA ON CPAP: ICD-10-CM

## 2024-01-23 PROCEDURE — 99214 OFFICE O/P EST MOD 30 MIN: CPT

## 2024-01-23 RX ORDER — METOPROLOL SUCCINATE 25 MG/1
25 TABLET, EXTENDED RELEASE ORAL DAILY
Qty: 90 TABLET | Refills: 3 | Status: SHIPPED | OUTPATIENT
Start: 2024-01-23

## 2024-01-23 RX ORDER — ATORVASTATIN CALCIUM 80 MG/1
80 TABLET, FILM COATED ORAL AT BEDTIME
Qty: 90 TABLET | Refills: 3 | Status: SHIPPED | OUTPATIENT
Start: 2024-01-23

## 2024-01-23 NOTE — LETTER
1/23/2024    Ji Albright MD  [address] [facility] [address]    RE: Jamie Charles       Dear Colleague,     I had the pleasure of seeing Jamie Charles in the [facility].    HEART CARE ENCOUNTER CONSULTATON NATHANIEL BROWN [facility]  [phone]      Assessment/Recommendations   Assessment:   Coronary artery disease: s/p STEMI and PCI of RCA 7/2019 with thrombectomy and U[place] stent - no angina on aspirin   Hyperlipidemia: Last LDL 86, atorvastatin 80 mg  - We discussed possible addition of Zetia with LDL above goal 1 year ago if LDL remains elevated on next lipid panel.  Patient recalls he was not fasting last year for lab.  PVCs: Metoprolol succinate   TERRANCE: uses CPAP nightly  History of tobacco use    Plan:   Will be having lipid panel with PCP at annual this year  Continue current medications as prescribed, refills provided  Continue nightly CPAP  Continue exercise, discussed improvement in heart healthy diet      Follow up in 1 year      History of Present Illness/Subjective    HPI: Jamie Charles is a 53 year old male with PMHx of CAD/STEMI/PCI 2019, HLD, PVCs, TERRANCE, history of tobacco use presents for follow up.  David reports feeling very well, no new concerns or changes since 1 year ago.  He denies any chest discomfort or pain.  He is exercising 3 to 4 hours a week with treadmill, arm bike, light weight lifting.  History of PVCs, and can recall about half a dozen episodes of single strong palpitation lasting seconds.  Considers these very well-controlled on metoprolol. Is planning to schedule follow-up with PCP for annual visit.  Recalls lipid panel with LDL above goal 1 year ago, recalls that he was not fasting appropriately for that test.  Discussion with primary Dr. Albright to make lifestyle changes and recheck in 1 year.  Admits to being less strict with his diet in the last year.    Of note he had ENT procedure for deviated septum and turbinate hypertrophy  8/2023 with much improved breathing, continues to use CPAP nightly with good sleep, denies fatigue.    He denies lightheadedness, shortness of breath, dyspnea on exertion, palpitations, and lower extremity edema.      Exercise echocardiogram stress test 11/2022:  Interpretation Summary  1. Abnormal stress echocardiogram by virtue of baseline imaging suggesting  basal and mid posterior hypokinesis. The specificity of the finding, however,  is reduced due to suboptimal acoustic imaging. This appears to persist  consistent with prior infarction. No obvious ischemia is identified, however.  2. Normal resting LV systolic performance with an ejection fraction of 55-60%.  There is normal improvement in left ventricular systolic performance with a  peak ejection fraction of 70-75%.  3. No ECG evidence of ischemia.  4. No anginal chest pain reported with exercise.  5. Average functional capacity for age.  6. Two(2) PVCs were noted. No other rhythm disturbances identified.     Stress Summary: Patient exercised on the Aaron protocol for a total of 9:00  minutes. Peak heart rate achieved was 138 b.p.m (82% max.) with a double-  product of 22,780. The patient stopped exercise due to generalized fatigue. No  chest pain symptoms were reported.     Electrocardiogram: Baseline ECG reveals normal sinus rhythm. There is ECG  criteria for inferior infarct. With exercise, there are no significant ECG  changes when compared to baseline to suggest ischemia. Two(2) PVCs were noted.  No other rhythm disturbances identified.     Baseline echocardiogram: Technically fair images were obtained in the standard  quad-screen format. LV systolic performance is normal with a visually  estimated ejection fraction of 55-60%. Baseline imaging suggests basal and mid  posterior hypokinesis. The specificity of the finding, however, is reduced due  to suboptimal acoustic imaging. No significant valvular heart disease is  identified on limited screening  Doppler.     Postexercise echocardiogram: Technically fair images were obtained immediately  post exercise in the standard quad-screen format. LV systolic performance  normally improves with a peak ejection fraction of 70-75%. On selected views,  the basal and mid posterior region of hypokinesis appears to persist  consistent with history of prior infarction. No obvious ischemia is detected,  however.  ______________________________________________________________________________  Stress  The patient did not exhibit any symptoms during exercise.  RPP 78077.  Exercise was stopped due to fatigue.  Patient was given 4ml mixture of 1.5ml Definity and 8.5ml saline.     Stress Results             Protocol:  Aaron          Maximum Predicted HR:   168 bpm             Target HR: 143 bpm        % Maximum Predicted HR: 82 %                             Stage  DurationHeart Rate  BP                                  (mm:ss)   (bpm)                           Peak    9:00      138   170/76                        RecoveryR  6:13      90    134/79             Stress Duration:   9:00 mm:ss        Recovery Time: 6:13 mm:ss          Maximum Stress HR: 138 bpm           METS:          10    Holter monitoring from 11/30/2022 to 12/1/2022 (duration 24hrs). Predominant underlying rhythm was sinus rhythm, 59 to 142bpm, average 78bpm. 1 episode of nonsustained atrial tachycardia, 8 beats, 144bpm. No sustained tachyarrhythmias. No atrial fibrillation. There were no pauses of greater than 3 seconds. Rare supraventricular ectopic beats (<1%). Rare premature ventricular contractions (<1%). No symptoms triggered.    Echocardiogram 7/2019 Results:    Normal left ventricular size and systolic function.    Left ventricle ejection fraction is normal. The calculated left ventricular ejection fraction is 65%.    No previous study for comparison.    Normal right ventricular size and systolic function.    No hemodynamically significant valvular heart  "abnormalities.    Coronary angiogram 7/2019:    Prox RCA to Mid RCA lesion is 100% stenosed.    Radial access was not utilized for     There are wall motion abnormalities in the left ventricle.    The ejection fraction is greater than 55% by visual estimate.     Successful PCI on RCA total occlusion using balloon, thrombectomy and Bairon stent         Physical Examination  Review of Systems   Vitals: /70 (BP Location: Left arm, Patient Position: Sitting, Cuff Size: Adult Large)   Pulse 75   Resp 16   Ht 1.81 m (5' 11.26\")   Wt 113.9 kg (251 lb)   SpO2 98%   BMI 34.75 kg/m    BMI= Body mass index is 34.75 kg/m .  Wt Readings from Last 3 Encounters:   01/23/24 113.9 kg (251 lb)   07/20/23 112.5 kg (248 lb 1.6 oz)   02/06/23 107.5 kg (237 lb)           ENT/Mouth: membranes moist, no oral lesions or bleeding gums.      EYES:  no scleral icterus, normal conjunctivae                    Neck: No carotid bruit    Chest/Lungs:   lungs are clear to auscultation, no rales or wheezing, equal chest wall expansion    Cardiovascular:   Regular. Normal first and second heart sounds with no murmurs, rubs, or gallops; the carotid, radial and posterior tibial pulses are intact, absent edema bilaterally        Extremities: no cyanosis or clubbing   Skin: no xanthelasma, warm.    Neurologic: no tremors     Psychiatric: alert and oriented x3, calm        Please refer above for cardiac ROS details.        Medical History  Surgical History Family History Social History   Past Medical History:   Diagnosis Date    Acute ST elevation myocardial infarction (STEMI) involving other coronary artery of inferior wall (H)     CAD (coronary artery disease)     HLD (hyperlipidemia)      Past Surgical History:   Procedure Laterality Date    ANGIOPLASTY  07/10/2019    DAVID to pRCA for STEMI    CV CORONARY ANGIOGRAM N/A 7/10/2019    Procedure: Coronary Angiogram;  Surgeon: Itz Alicea MD;  Location: [facility];  Service: " Cardiology    CV LEFT HEART CATHETERIZATION WITH LEFT VENTRICULOGRAM N/A 7/10/2019    Procedure: Left Heart Catheterization with Left Ventriculogram;  Surgeon: Itz Alicea MD;  Location: [facility] Lab;  Service: Cardiology     Family History   Problem Relation Age of Onset    Diabetes Mother     Diverticulitis Father     No Known Problems Sister     No Known Problems Brother     No Known Problems Brother         Social History     Socioeconomic History    Marital status: Single     Spouse name: Not on file    Number of children: Not on file    Years of education: Not on file    Highest education level: Not on file   Occupational History    Not on file   Tobacco Use    Smoking status: Former     Packs/day: 0.50     Years: 32.00     Additional pack years: 0.00     Total pack years: 16.00     Types: Cigarettes     Start date: 1987     Quit date: 7/10/2019     Years since quittin.5    Smokeless tobacco: Former     Quit date: 1999   Vaping Use    Vaping Use: Never used   Substance and Sexual Activity    Alcohol use: Not Currently     Comment: few drinks 2 nights per week    Drug use: Never    Sexual activity: Yes     Partners: Female   Other Topics Concern    Not on file   Social History Narrative    Lives with his girlfriend/partner, Deepthi.  Works in [employer], industrial laundry.       Social Determinants of Health     Financial Resource Strain: Not on file   Food Insecurity: Not on file   Transportation Needs: Not on file   Physical Activity: Not on file   Stress: Not on file   Social Connections: Not on file   Interpersonal Safety: Not on file   Housing Stability: Not on file           Medications  Allergies   Current Outpatient Medications   Medication Sig Dispense Refill    aspirin 81 mg chewable tablet [ASPIRIN 81 MG CHEWABLE TABLET] Chew 1 tablet (81 mg total) daily.  0    atorvastatin (LIPITOR) 80 MG tablet TAKE 1 TABLET(80 MG) BY MOUTH AT BEDTIME 90 tablet 0    clobetasol  (TEMOVATE) 0.05 % external ointment Apply topically 2 times daily 60 g 1    magnesium gluconate (MAGONATE) 27 mg magnesium (500 mg) Tab tablet [MAGNESIUM GLUCONATE (MAGONATE) 27 MG MAGNESIUM (500 MG) TAB TABLET] Take 2 tablets (54 mg total) by mouth at bedtime.  0    metoprolol succinate ER (TOPROL XL) 25 MG 24 hr tablet TAKE 1 TABLET(25 MG) BY MOUTH DAILY 90 tablet 0     No Known Allergies       Lab Results    Chemistry/lipid CBC Cardiac Enzymes/BNP/TSH/INR   Recent Labs   Lab Test 02/06/23  0819   CHOL 159   HDL 40   LDL 86   TRIG 166*     Recent Labs   Lab Test 02/06/23  0819 08/14/20  0900 10/18/19  0936   LDL 86 71 65     Recent Labs   Lab Test 07/20/23  0835      POTASSIUM 4.1   CHLORIDE 104   CO2 23   *   BUN 14.9   CR 0.93   GFRESTIMATED >90   VICENTA 9.4     Recent Labs   Lab Test 07/20/23  0835 02/06/23  0819 08/14/20  0900   CR 0.93 0.92 1.00     Recent Labs   Lab Test 02/06/23  0819 08/14/20  0900 07/11/19  0833   A1C 6.1* 6.0* 6.0          Recent Labs   Lab Test 02/06/23  0819   WBC 7.3   HGB 14.3   HCT 43.1   MCV 88        Recent Labs   Lab Test 02/06/23  0819 08/14/20  0900 07/29/19  0914   HGB 14.3 14.6 13.8*    Recent Labs   Lab Test 07/29/19  2356 07/29/19  1809 07/29/19  1113   TROPONINI 0.04 0.04 0.04     Recent Labs   Lab Test 07/29/19  0914   *     Recent Labs   Lab Test 02/06/23  0819   TSH 2.41     Recent Labs   Lab Test 07/29/19  0914 07/10/19  2136   INR 1.07 0.98          This note has been dictated using voice recognition software. Any grammatical, typographical, or context distortions are unintentional and inherent to the software    Bailey Healy PA-C      Thank you for allowing me to participate in the care of your patient.      Sincerely,     Bailey Arias PA-C     [facility]  cc:   No referring provider defined for this encounter.

## 2024-01-23 NOTE — PATIENT INSTRUCTIONS
It was a pleasure taking part in your care today:    - cholesterol check with Dr. Albright at next visit    Please call the [facility] clinic with any questions or concerns at [phone].     Bailey Healy PA-C

## 2024-03-09 ENCOUNTER — HEALTH MAINTENANCE LETTER (OUTPATIENT)
Age: 54
End: 2024-03-09

## 2024-08-04 ENCOUNTER — PATIENT OUTREACH (OUTPATIENT)
Dept: CARE COORDINATION | Facility: CLINIC | Age: 54
End: 2024-08-04
Payer: COMMERCIAL

## 2024-09-04 ENCOUNTER — TELEPHONE (OUTPATIENT)
Dept: INTERNAL MEDICINE | Facility: CLINIC | Age: 54
End: 2024-09-04
Payer: COMMERCIAL

## 2024-09-04 DIAGNOSIS — E78.2 MIXED HYPERLIPIDEMIA: Primary | ICD-10-CM

## 2024-09-04 DIAGNOSIS — Z12.5 SCREENING FOR PROSTATE CANCER: ICD-10-CM

## 2024-09-04 DIAGNOSIS — I10 ESSENTIAL HYPERTENSION WITH GOAL BLOOD PRESSURE LESS THAN 140/90: ICD-10-CM

## 2024-09-04 DIAGNOSIS — Z13.1 SCREENING FOR DIABETES MELLITUS: ICD-10-CM

## 2024-09-04 DIAGNOSIS — I25.118 CORONARY ARTERY DISEASE INVOLVING NATIVE CORONARY ARTERY OF NATIVE HEART WITH OTHER FORM OF ANGINA PECTORIS (H): ICD-10-CM

## 2024-09-04 NOTE — TELEPHONE ENCOUNTER
Pt coming in to be seen  and would like to do any labs dr thinks is needed regarding passing out and reg labs  , did schedule labs in [place] and visit with provider, please advise.  [phone] may leave a message

## 2024-09-05 NOTE — TELEPHONE ENCOUNTER
He has been passing out and he wants me to check labs before his visit to investigate this?  This would not be the order I would recommend doing this.  First I should talk with him examine him and then make a recommendation for evaluation.  If he wants annual labs checked, I would recommend CMP, CBC, lipid profile, hemoglobin A1c, PSA, TSH, urine analysis.

## 2024-09-06 NOTE — TELEPHONE ENCOUNTER
Spoke with patient who states his main concern was getting the lab work before his appointment with Dr Albright on 9/26. States he will further discuss the passing out at his appointment.    States he has been dealing with this issue for several years and has mentioned it in passing to Dr Albright in the past. States it has happened three times in the last seven years. States he always knows when it is about to happen and only happens with he drinks a carbonated beverage. States the last time was earlier this week and he called EMS who found he was vitally stable. He has no injuries. He does wonder if this has anything to do with his medications.

## 2024-09-06 NOTE — TELEPHONE ENCOUNTER
Left voicemail for patient to return call to clinic to gather more information/triage if necessary.     (1/3)

## 2024-09-17 ENCOUNTER — LAB (OUTPATIENT)
Dept: LAB | Facility: CLINIC | Age: 54
End: 2024-09-17
Payer: COMMERCIAL

## 2024-09-17 DIAGNOSIS — E78.2 MIXED HYPERLIPIDEMIA: ICD-10-CM

## 2024-09-17 DIAGNOSIS — Z12.5 SCREENING FOR PROSTATE CANCER: ICD-10-CM

## 2024-09-17 DIAGNOSIS — Z13.1 SCREENING FOR DIABETES MELLITUS: ICD-10-CM

## 2024-09-17 LAB
ALBUMIN SERPL BCG-MCNC: 4.2 G/DL (ref 3.5–5.2)
ALBUMIN UR-MCNC: NEGATIVE MG/DL
ALP SERPL-CCNC: 119 U/L (ref 40–150)
ALT SERPL W P-5'-P-CCNC: 33 U/L (ref 0–70)
ANION GAP SERPL CALCULATED.3IONS-SCNC: 10 MMOL/L (ref 7–15)
APPEARANCE UR: CLEAR
AST SERPL W P-5'-P-CCNC: 26 U/L (ref 0–45)
BILIRUB SERPL-MCNC: 0.8 MG/DL
BILIRUB UR QL STRIP: NEGATIVE
BUN SERPL-MCNC: 12.4 MG/DL (ref 6–20)
CALCIUM SERPL-MCNC: 9 MG/DL (ref 8.8–10.4)
CHLORIDE SERPL-SCNC: 103 MMOL/L (ref 98–107)
CHOLEST SERPL-MCNC: 116 MG/DL
COLOR UR AUTO: YELLOW
CREAT SERPL-MCNC: 0.94 MG/DL (ref 0.67–1.17)
EGFRCR SERPLBLD CKD-EPI 2021: >90 ML/MIN/1.73M2
ERYTHROCYTE [DISTWIDTH] IN BLOOD BY AUTOMATED COUNT: 12.9 % (ref 10–15)
FASTING STATUS PATIENT QL REPORTED: YES
FASTING STATUS PATIENT QL REPORTED: YES
GLUCOSE SERPL-MCNC: 104 MG/DL (ref 70–99)
GLUCOSE UR STRIP-MCNC: NEGATIVE MG/DL
HBA1C MFR BLD: 6.4 % (ref 0–5.6)
HCO3 SERPL-SCNC: 26 MMOL/L (ref 22–29)
HCT VFR BLD AUTO: 39.3 % (ref 40–53)
HDLC SERPL-MCNC: 35 MG/DL
HGB BLD-MCNC: 13.2 G/DL (ref 13.3–17.7)
HGB UR QL STRIP: NEGATIVE
KETONES UR STRIP-MCNC: NEGATIVE MG/DL
LDLC SERPL CALC-MCNC: 51 MG/DL
LEUKOCYTE ESTERASE UR QL STRIP: NEGATIVE
MCH RBC QN AUTO: 30.1 PG (ref 26.5–33)
MCHC RBC AUTO-ENTMCNC: 33.6 G/DL (ref 31.5–36.5)
MCV RBC AUTO: 90 FL (ref 78–100)
NITRATE UR QL: NEGATIVE
NONHDLC SERPL-MCNC: 81 MG/DL
PH UR STRIP: 5.5 [PH] (ref 5–8)
PLATELET # BLD AUTO: 214 10E3/UL (ref 150–450)
POTASSIUM SERPL-SCNC: 4.2 MMOL/L (ref 3.4–5.3)
PROT SERPL-MCNC: 7.1 G/DL (ref 6.4–8.3)
PSA SERPL DL<=0.01 NG/ML-MCNC: 0.22 NG/ML (ref 0–3.5)
RBC # BLD AUTO: 4.39 10E6/UL (ref 4.4–5.9)
SODIUM SERPL-SCNC: 139 MMOL/L (ref 135–145)
SP GR UR STRIP: 1.02 (ref 1–1.03)
TRIGL SERPL-MCNC: 150 MG/DL
TSH SERPL DL<=0.005 MIU/L-ACNC: 1.93 UIU/ML (ref 0.3–4.2)
UROBILINOGEN UR STRIP-ACNC: 0.2 E.U./DL
WBC # BLD AUTO: 8.9 10E3/UL (ref 4–11)

## 2024-09-17 PROCEDURE — 80061 LIPID PANEL: CPT

## 2024-09-17 PROCEDURE — 85027 COMPLETE CBC AUTOMATED: CPT

## 2024-09-17 PROCEDURE — 36415 COLL VENOUS BLD VENIPUNCTURE: CPT

## 2024-09-17 PROCEDURE — 83036 HEMOGLOBIN GLYCOSYLATED A1C: CPT

## 2024-09-17 PROCEDURE — 80053 COMPREHEN METABOLIC PANEL: CPT

## 2024-09-17 PROCEDURE — 81003 URINALYSIS AUTO W/O SCOPE: CPT

## 2024-09-17 PROCEDURE — G0103 PSA SCREENING: HCPCS

## 2024-09-17 PROCEDURE — 84443 ASSAY THYROID STIM HORMONE: CPT

## 2024-09-26 ENCOUNTER — OFFICE VISIT (OUTPATIENT)
Dept: INTERNAL MEDICINE | Facility: CLINIC | Age: 54
End: 2024-09-26
Payer: COMMERCIAL

## 2024-09-26 VITALS
BODY MASS INDEX: 34.31 KG/M2 | HEART RATE: 69 BPM | RESPIRATION RATE: 15 BRPM | DIASTOLIC BLOOD PRESSURE: 88 MMHG | SYSTOLIC BLOOD PRESSURE: 136 MMHG | OXYGEN SATURATION: 99 % | TEMPERATURE: 97.7 F | WEIGHT: 245.1 LBS | HEIGHT: 71 IN

## 2024-09-26 DIAGNOSIS — G47.33 OSA ON CPAP: ICD-10-CM

## 2024-09-26 DIAGNOSIS — I25.118 CORONARY ARTERY DISEASE INVOLVING NATIVE CORONARY ARTERY OF NATIVE HEART WITH OTHER FORM OF ANGINA PECTORIS (H): ICD-10-CM

## 2024-09-26 DIAGNOSIS — R73.9 HYPERGLYCEMIA: ICD-10-CM

## 2024-09-26 DIAGNOSIS — D64.9 NORMOCYTIC ANEMIA: ICD-10-CM

## 2024-09-26 DIAGNOSIS — L30.1 DYSHIDROTIC ECZEMA: ICD-10-CM

## 2024-09-26 DIAGNOSIS — R55 SYNCOPE, UNSPECIFIED SYNCOPE TYPE: ICD-10-CM

## 2024-09-26 DIAGNOSIS — E78.2 MIXED HYPERLIPIDEMIA: ICD-10-CM

## 2024-09-26 DIAGNOSIS — E66.811 CLASS 1 OBESITY DUE TO EXCESS CALORIES WITH SERIOUS COMORBIDITY AND BODY MASS INDEX (BMI) OF 34.0 TO 34.9 IN ADULT: ICD-10-CM

## 2024-09-26 DIAGNOSIS — I25.5 ISCHEMIC CARDIOMYOPATHY: ICD-10-CM

## 2024-09-26 DIAGNOSIS — I10 ESSENTIAL HYPERTENSION WITH GOAL BLOOD PRESSURE LESS THAN 140/90: ICD-10-CM

## 2024-09-26 DIAGNOSIS — E66.09 CLASS 1 OBESITY DUE TO EXCESS CALORIES WITH SERIOUS COMORBIDITY AND BODY MASS INDEX (BMI) OF 34.0 TO 34.9 IN ADULT: ICD-10-CM

## 2024-09-26 DIAGNOSIS — Z12.11 SCREEN FOR COLON CANCER: ICD-10-CM

## 2024-09-26 DIAGNOSIS — Z00.00 ANNUAL PHYSICAL EXAM: Primary | ICD-10-CM

## 2024-09-26 DIAGNOSIS — Z87.891 PERSONAL HISTORY OF NICOTINE DEPENDENCE: ICD-10-CM

## 2024-09-26 LAB
ERYTHROCYTE [DISTWIDTH] IN BLOOD BY AUTOMATED COUNT: 12.9 % (ref 10–15)
FERRITIN SERPL-MCNC: 200 NG/ML (ref 31–409)
FOLATE SERPL-MCNC: 6.8 NG/ML (ref 4.6–34.8)
HCT VFR BLD AUTO: 42.5 % (ref 40–53)
HGB BLD-MCNC: 14.2 G/DL (ref 13.3–17.7)
MCH RBC QN AUTO: 29.3 PG (ref 26.5–33)
MCHC RBC AUTO-ENTMCNC: 33.4 G/DL (ref 31.5–36.5)
MCV RBC AUTO: 88 FL (ref 78–100)
PLATELET # BLD AUTO: 268 10E3/UL (ref 150–450)
RBC # BLD AUTO: 4.84 10E6/UL (ref 4.4–5.9)
VIT B12 SERPL-MCNC: 552 PG/ML (ref 232–1245)
WBC # BLD AUTO: 7.9 10E3/UL (ref 4–11)

## 2024-09-26 PROCEDURE — 82607 VITAMIN B-12: CPT | Performed by: INTERNAL MEDICINE

## 2024-09-26 PROCEDURE — 99396 PREV VISIT EST AGE 40-64: CPT | Performed by: INTERNAL MEDICINE

## 2024-09-26 PROCEDURE — 82728 ASSAY OF FERRITIN: CPT | Performed by: INTERNAL MEDICINE

## 2024-09-26 PROCEDURE — 99214 OFFICE O/P EST MOD 30 MIN: CPT | Mod: 25 | Performed by: INTERNAL MEDICINE

## 2024-09-26 PROCEDURE — 85027 COMPLETE CBC AUTOMATED: CPT | Performed by: INTERNAL MEDICINE

## 2024-09-26 PROCEDURE — 82746 ASSAY OF FOLIC ACID SERUM: CPT | Performed by: INTERNAL MEDICINE

## 2024-09-26 PROCEDURE — 36415 COLL VENOUS BLD VENIPUNCTURE: CPT | Performed by: INTERNAL MEDICINE

## 2024-09-26 RX ORDER — CLOBETASOL PROPIONATE 0.5 MG/G
OINTMENT TOPICAL 2 TIMES DAILY
Qty: 60 G | Refills: 1 | Status: SHIPPED | OUTPATIENT
Start: 2024-09-26

## 2024-09-26 ASSESSMENT — PAIN SCALES - GENERAL: PAINLEVEL: NO PAIN (0)

## 2024-09-26 NOTE — PROGRESS NOTES
Office Visit - Physical   Jamie Charles   54 year old  male    Date of visit: 9/26/2024  Physician: Ji Albright MD     Assessment and Plan   1. Annual physical exam  This is a 54-year-old man with issues as discussed below.  Offered vaccinations but he defers    2. Screen for colon cancer  If he is iron deficient we will add an upper endoscopy  - Colonoscopy Screening  Referral; Future    3. Personal history of nicotine dependence  He would like to start lung cancer screening at age 55    4. Syncope, unspecified syncope type  He has had about 6 episodes since he turned 16 a little bit more frequent over the past 7 years, always in relationship to drinking carbonated beverage, having a sensation he has to belch, not being able to belch then feeling lightheaded, sitting down and feeling better and occasionally when he gets back up passing out.  Briefly.  He has had some evaluation in the past including event monitor which looked okay.  Given the infrequency of these events I would think he would need an implantable loop recorder.  He declines this today or defers this.  We discussed arrhythmia versus more likely vasovagal type phenomenon.  Advised to make sure he sits down for a longer period of time before he gets back up.    5. Hyperglycemia  Hemoglobin A1c is 6.4%, discussed reduction in calories, carbohydrates, regular exercise and modest weight loss    6. CAD, STEMI '19, DAVID RCA  Continue secondary prevention    7. Ischemic cardiomyopathy  Stable follows with cardiology    8. Essential hypertension with goal blood pressure less than 140/90  Pressure borderline, discussed consideration of additional medications and he defers    9. Dyshidrotic eczema  - clobetasol (TEMOVATE) 0.05 % external ointment; Apply topically 2 times daily.  Dispense: 60 g; Refill: 1    10. Mixed hyperlipidemia  Annual high-dose atorvastatin    11. TERRANCE on CPAP  Has been quite helpful    12. Normocytic anemia  Etiology  uncertain, check labs as below  - CBC with platelets; Future  - Ferritin; Future  - Vitamin B12; Future  - Folate; Future  - CBC with platelets  - Ferritin  - Vitamin B12  - Folate    13. Class 1 obesity due to excess calories with serious comorbidity and body mass index (BMI) of 34.0 to 34.9 in adult  The following high BMI interventions were performed this visit: encouragement to exercise and lifestyle education regarding diet    Return in about 6 months (around 3/26/2025) for Follow up.     Chief Complaint   Chief Complaint   Patient presents with    Syncope     Labs and med refills        Patient Profile   Social History     Social History Narrative    Lives with his girlfriend/partner, Deepthi.  Works in [employer], industrial laundry.          Past Medical History   Patient Active Problem List   Diagnosis    Essential hypertension with goal blood pressure less than 140/90    Mixed hyperlipidemia    Ischemic cardiomyopathy    CAD, STEMI '19, DAVID RCA    TERRANCE on CPAP    Hyperglycemia    Class 1 obesity due to excess calories with serious comorbidity and body mass index (BMI) of 34.0 to 34.9 in adult       Past Surgical History  He has a past surgical history that includes Angioplasty (07/10/2019); Cv Coronary Angiogram (N/A, 7/10/2019); and Cv Left Heart Catheterization With Left Ventriculogram (N/A, 7/10/2019).     History of Present Illness   This 54 year old man comes in for follow-up and annual visit.  He had his labs drawn before and we reviewed these.  Blood sugar increasing and a little bit anemic.  About 3 weeks ago he had a syncopal episode.  He had some couple of drinks of a carbonated beverage, felt like he had to belch, could not belch, felt lightheaded sat down felt better, got up and grabbed his bowling ball and passed out.  Paramedics were called it sounds like everything looked okay.  This first happened when he was 16 and then happened 3 more times between age 16 and 46.  Since age 46 it is  probably happened 3 more times.  He has had a cardiac monitor which looked okay.  It is always in relationship to drinking a carbonated beverage, feeling like he has to belch, getting lightheaded, sitting down feeling better and a couple of times he has passed out.    Review of Systems: A comprehensive review of systems was negative except as noted.     Medications and Allergies   Current Outpatient Medications   Medication Sig Dispense Refill    aspirin 81 mg chewable tablet [ASPIRIN 81 MG CHEWABLE TABLET] Chew 1 tablet (81 mg total) daily.  0    atorvastatin (LIPITOR) 80 MG tablet Take 1 tablet (80 mg) by mouth at bedtime 90 tablet 3    clobetasol (TEMOVATE) 0.05 % external ointment Apply topically 2 times daily. 60 g 1    magnesium gluconate (MAGONATE) 27 mg magnesium (500 mg) Tab tablet [MAGNESIUM GLUCONATE (MAGONATE) 27 MG MAGNESIUM (500 MG) TAB TABLET] Take 2 tablets (54 mg total) by mouth at bedtime.  0    metoprolol succinate ER (TOPROL XL) 25 MG 24 hr tablet Take 1 tablet (25 mg) by mouth daily 90 tablet 3     No Known Allergies     Family and Social History   Family History   Problem Relation Age of Onset    Diabetes Mother     Diverticulitis Father     No Known Problems Sister     No Known Problems Brother     No Known Problems Brother         Social History     Tobacco Use    Smoking status: Former     Current packs/day: 0.00     Average packs/day: 0.5 packs/day for 32.0 years (16.0 ttl pk-yrs)     Types: Cigarettes     Start date: 1987     Quit date: 7/10/2019     Years since quittin.2    Smokeless tobacco: Former     Quit date: 1999   Vaping Use    Vaping status: Never Used   Substance Use Topics    Alcohol use: Not Currently     Comment: few drinks 2 nights per week    Drug use: Never        Physical Exam   General Appearance:   No acute distress    /88 (BP Location: Left arm, Patient Position: Sitting, Cuff Size: Adult Large)   Pulse 69   Temp 97.7  F (36.5  C)   Resp 15   Ht  "1.803 m (5' 11\")   Wt 111.2 kg (245 lb 1.6 oz)   SpO2 99%   BMI 34.18 kg/m      EYES: Eyelids, conjunctiva, and sclera were normal. Pupils were normal. Cornea, iris, and lens were normal bilaterally.  HEAD, EARS, NOSE, MOUTH, AND THROAT: Head and face were normal. Hearing was normal to voice and the ears were normal to external exam. Nose appearance was normal and there was no discharge.  NECK: Neck appearance was normal.   RESPIRATORY: Breathing pattern was normal and the chest moved symmetrically.  Lung sounds were normal and there were no abnormal sounds.  CARDIOVASCULAR: Heart rate and rhythm were normal.  S1 and S2 were normal and there were no extra sounds or murmurs. There was no peripheral edema.  GASTROINTESTINAL: The abdomen was normal in contour.    NEUROLOGIC: The patient was alert and oriented to person, place, time, and circumstance. Speech was normal. Cranial nerves were normal. Motor strength was normal for age. The patient was normally coordinated.  PSYCHIATRIC:  Mood and affect were normal and the patient had normal recent and remote memory. The patient's judgment and insight were normal.       Additional Information        Ji Albright MD  Internal Medicine  Contact me at [phone]  Answers submitted by the patient for this visit:  General Questionnaire (Submitted on 9/25/2024)  Chief Complaint: Chronic problems general questions HPI Form  What is the reason for your visit today? : Blood work and fainting spell  How many days per week do you miss taking your medication?: 0    "

## 2024-12-12 ENCOUNTER — TRANSFERRED RECORDS (OUTPATIENT)
Dept: HEALTH INFORMATION MANAGEMENT | Facility: CLINIC | Age: 54
End: 2024-12-12
Payer: COMMERCIAL

## 2024-12-16 PROBLEM — D12.6 ADENOMA OF COLON: Status: ACTIVE | Noted: 2024-12-16

## 2024-12-17 ENCOUNTER — PATIENT OUTREACH (OUTPATIENT)
Dept: GASTROENTEROLOGY | Facility: CLINIC | Age: 54
End: 2024-12-17
Payer: COMMERCIAL

## 2025-01-15 ENCOUNTER — MYC REFILL (OUTPATIENT)
Dept: CARDIOLOGY | Facility: CLINIC | Age: 55
End: 2025-01-15
Payer: COMMERCIAL

## 2025-01-15 DIAGNOSIS — E78.2 MIXED HYPERLIPIDEMIA: ICD-10-CM

## 2025-01-15 DIAGNOSIS — I25.10 CORONARY ARTERY DISEASE INVOLVING NATIVE CORONARY ARTERY OF NATIVE HEART WITHOUT ANGINA PECTORIS: ICD-10-CM

## 2025-01-16 RX ORDER — ATORVASTATIN CALCIUM 80 MG/1
80 TABLET, FILM COATED ORAL AT BEDTIME
Qty: 90 TABLET | Refills: 0 | Status: SHIPPED | OUTPATIENT
Start: 2025-01-16

## 2025-01-16 NOTE — TELEPHONE ENCOUNTER
Last seen 1/23/24 by MH. Pt due for yearly follow-up. Message sent to scheduling to arrange. Instructed pt to schedule follow-up for further refills. LMS

## 2025-04-07 ENCOUNTER — MYC REFILL (OUTPATIENT)
Dept: CARDIOLOGY | Facility: CLINIC | Age: 55
End: 2025-04-07
Payer: COMMERCIAL

## 2025-04-07 DIAGNOSIS — I49.3 PVC'S (PREMATURE VENTRICULAR CONTRACTIONS): ICD-10-CM

## 2025-04-07 DIAGNOSIS — E78.2 MIXED HYPERLIPIDEMIA: ICD-10-CM

## 2025-04-07 DIAGNOSIS — I10 ESSENTIAL HYPERTENSION: ICD-10-CM

## 2025-04-07 DIAGNOSIS — I25.10 CORONARY ARTERY DISEASE INVOLVING NATIVE CORONARY ARTERY OF NATIVE HEART WITHOUT ANGINA PECTORIS: ICD-10-CM

## 2025-04-07 RX ORDER — METOPROLOL SUCCINATE 25 MG/1
25 TABLET, EXTENDED RELEASE ORAL DAILY
Qty: 30 TABLET | Refills: 0 | Status: SHIPPED | OUTPATIENT
Start: 2025-04-07

## 2025-04-07 RX ORDER — ATORVASTATIN CALCIUM 80 MG/1
80 TABLET, FILM COATED ORAL AT BEDTIME
Qty: 30 TABLET | Refills: 0 | Status: SHIPPED | OUTPATIENT
Start: 2025-04-07

## 2025-04-28 ENCOUNTER — MYC REFILL (OUTPATIENT)
Dept: CARDIOLOGY | Facility: CLINIC | Age: 55
End: 2025-04-28
Payer: COMMERCIAL

## 2025-04-28 DIAGNOSIS — I10 ESSENTIAL HYPERTENSION: ICD-10-CM

## 2025-04-28 DIAGNOSIS — I49.3 PVC'S (PREMATURE VENTRICULAR CONTRACTIONS): ICD-10-CM

## 2025-04-28 DIAGNOSIS — E78.2 MIXED HYPERLIPIDEMIA: ICD-10-CM

## 2025-04-28 DIAGNOSIS — I25.10 CORONARY ARTERY DISEASE INVOLVING NATIVE CORONARY ARTERY OF NATIVE HEART WITHOUT ANGINA PECTORIS: ICD-10-CM

## 2025-04-29 RX ORDER — METOPROLOL SUCCINATE 25 MG/1
25 TABLET, EXTENDED RELEASE ORAL DAILY
Qty: 30 TABLET | Refills: 0 | Status: SHIPPED | OUTPATIENT
Start: 2025-04-29

## 2025-04-29 RX ORDER — ATORVASTATIN CALCIUM 80 MG/1
80 TABLET, FILM COATED ORAL AT BEDTIME
Qty: 30 TABLET | Refills: 0 | Status: SHIPPED | OUTPATIENT
Start: 2025-04-29

## 2025-06-02 ENCOUNTER — MYC MEDICAL ADVICE (OUTPATIENT)
Dept: INTERNAL MEDICINE | Facility: CLINIC | Age: 55
End: 2025-06-02
Payer: COMMERCIAL

## 2025-06-02 DIAGNOSIS — I49.3 PVC'S (PREMATURE VENTRICULAR CONTRACTIONS): ICD-10-CM

## 2025-06-02 DIAGNOSIS — E78.2 MIXED HYPERLIPIDEMIA: ICD-10-CM

## 2025-06-02 DIAGNOSIS — I10 ESSENTIAL HYPERTENSION: ICD-10-CM

## 2025-06-02 DIAGNOSIS — I25.10 CORONARY ARTERY DISEASE INVOLVING NATIVE CORONARY ARTERY OF NATIVE HEART WITHOUT ANGINA PECTORIS: ICD-10-CM

## 2025-06-02 NOTE — TELEPHONE ENCOUNTER
"Per chart review, Cardiology recommended follow up in one year at last office visit 1/23/2024. Current prescriptions from Cardiology state \"-patient to call to schedule annual cardiology follow-up appt for further refills\".   "

## 2025-06-03 RX ORDER — METOPROLOL SUCCINATE 25 MG/1
25 TABLET, EXTENDED RELEASE ORAL DAILY
Qty: 90 TABLET | Refills: 4 | Status: SHIPPED | OUTPATIENT
Start: 2025-06-03

## 2025-06-03 RX ORDER — ATORVASTATIN CALCIUM 80 MG/1
80 TABLET, FILM COATED ORAL AT BEDTIME
Qty: 90 TABLET | Refills: 4 | Status: SHIPPED | OUTPATIENT
Start: 2025-06-03

## 2025-08-27 ENCOUNTER — PATIENT OUTREACH (OUTPATIENT)
Dept: CARE COORDINATION | Facility: CLINIC | Age: 55
End: 2025-08-27
Payer: COMMERCIAL